# Patient Record
Sex: FEMALE | Race: WHITE | Employment: FULL TIME | URBAN - NONMETROPOLITAN AREA
[De-identification: names, ages, dates, MRNs, and addresses within clinical notes are randomized per-mention and may not be internally consistent; named-entity substitution may affect disease eponyms.]

---

## 2017-07-11 DIAGNOSIS — R56.9 PARTIAL SEIZURE (HCC): ICD-10-CM

## 2017-07-11 RX ORDER — LEVETIRACETAM 750 MG/1
750 TABLET ORAL 2 TIMES DAILY
Qty: 60 TABLET | Refills: 6 | Status: SHIPPED | OUTPATIENT
Start: 2017-07-11 | End: 2018-02-05 | Stop reason: SDUPTHER

## 2017-07-11 RX ORDER — PHENYTOIN SODIUM 300 MG/1
CAPSULE, EXTENDED RELEASE ORAL
Qty: 60 CAPSULE | Refills: 6 | Status: SHIPPED | OUTPATIENT
Start: 2017-07-11 | End: 2018-02-05 | Stop reason: SDUPTHER

## 2017-07-11 RX ORDER — PHENYTOIN SODIUM 100 MG/1
CAPSULE, EXTENDED RELEASE ORAL
Qty: 30 CAPSULE | Refills: 6 | Status: SHIPPED | OUTPATIENT
Start: 2017-07-11 | End: 2018-02-05 | Stop reason: SDUPTHER

## 2017-07-19 ENCOUNTER — OFFICE VISIT (OUTPATIENT)
Dept: PHYSICAL MEDICINE AND REHAB | Age: 57
End: 2017-07-19
Payer: COMMERCIAL

## 2017-07-19 VITALS
SYSTOLIC BLOOD PRESSURE: 181 MMHG | HEART RATE: 78 BPM | RESPIRATION RATE: 24 BRPM | BODY MASS INDEX: 45.99 KG/M2 | DIASTOLIC BLOOD PRESSURE: 92 MMHG | WEIGHT: 293 LBS | HEIGHT: 67 IN

## 2017-07-19 DIAGNOSIS — R56.9 PARTIAL SEIZURE (HCC): Primary | ICD-10-CM

## 2017-07-19 PROCEDURE — 99213 OFFICE O/P EST LOW 20 MIN: CPT | Performed by: PSYCHIATRY & NEUROLOGY

## 2017-10-16 LAB
ALBUMIN SERPL-MCNC: 4.4 G/DL
ALP BLD-CCNC: 178 U/L
ALT SERPL-CCNC: 29 U/L
ANION GAP SERPL CALCULATED.3IONS-SCNC: 10 MMOL/L
AST SERPL-CCNC: 25 U/L
BASOPHILS ABSOLUTE: 0.02 /ΜL
BASOPHILS RELATIVE PERCENT: 0 %
BILIRUB SERPL-MCNC: 0.22 MG/DL (ref 0.1–1.4)
BILIRUBIN URINE: NORMAL MG/DL
BLOOD, URINE: NORMAL
BUN BLDV-MCNC: 11 MG/DL
CALCIUM SERPL-MCNC: 9.4 MG/DL
CHLORIDE BLD-SCNC: 106 MMOL/L
CLARITY: NORMAL
CO2: 25 MMOL/L
COLOR: YELLOW
CREAT SERPL-MCNC: 0.9 MG/DL
EOSINOPHILS ABSOLUTE: 0.22 /ΜL
EOSINOPHILS RELATIVE PERCENT: 2 %
GFR CALCULATED: NORMAL
GLUCOSE BLD-MCNC: 113 MG/DL
GLUCOSE URINE: NEGATIVE
HCT VFR BLD CALC: 43.2 % (ref 36–46)
HEMOGLOBIN: 15 G/DL (ref 12–16)
KETONES, URINE: NEGATIVE
LEUKOCYTE ESTERASE, URINE: NEGATIVE
LYMPHOCYTES ABSOLUTE: 2.93 /ΜL
LYMPHOCYTES RELATIVE PERCENT: 30 %
MCH RBC QN AUTO: 31.6 PG
MCHC RBC AUTO-ENTMCNC: 34.7 G/DL
MCV RBC AUTO: 91.1 FL
MONOCYTES ABSOLUTE: 1.08 /ΜL
MONOCYTES RELATIVE PERCENT: 11 %
NEUTROPHILS ABSOLUTE: 5.62 /ΜL
NEUTROPHILS RELATIVE PERCENT: 57 %
NITRITE, URINE: NEGATIVE
PDW BLD-RTO: 12.5 %
PH UA: 5 (ref 4.5–8)
PLATELET # BLD: 250 K/ΜL
PMV BLD AUTO: NORMAL FL
POTASSIUM SERPL-SCNC: 3.6 MMOL/L
PROTEIN UA: NORMAL
RBC # BLD: 4.74 10^6/ΜL
SODIUM BLD-SCNC: 141 MMOL/L
SPECIFIC GRAVITY UA: 1.02 (ref 1–1.03)
TOTAL PROTEIN: 7.9
UROBILINOGEN, URINE: NORMAL
WBC # BLD: 9.9 10^3/ML

## 2017-10-18 ENCOUNTER — HOSPITAL ENCOUNTER (OUTPATIENT)
Age: 57
Discharge: HOME | End: 2017-10-18
Payer: COMMERCIAL

## 2017-10-18 ENCOUNTER — OFFICE VISIT (OUTPATIENT)
Dept: UROLOGY | Age: 57
End: 2017-10-18
Payer: COMMERCIAL

## 2017-10-18 ENCOUNTER — TELEPHONE (OUTPATIENT)
Dept: UROLOGY | Age: 57
End: 2017-10-18

## 2017-10-18 VITALS
SYSTOLIC BLOOD PRESSURE: 166 MMHG | WEIGHT: 293 LBS | BODY MASS INDEX: 45.99 KG/M2 | DIASTOLIC BLOOD PRESSURE: 84 MMHG | HEIGHT: 67 IN

## 2017-10-18 DIAGNOSIS — R35.0 URINARY FREQUENCY: ICD-10-CM

## 2017-10-18 DIAGNOSIS — N21.0 BLADDER CALCULUS: Primary | ICD-10-CM

## 2017-10-18 LAB
BILIRUBIN URINE: NEGATIVE
BLOOD URINE, POC: ABNORMAL
CHARACTER, URINE: CLEAR
COLOR, URINE: YELLOW
EKG ATRIAL RATE: 76 BPM
EKG P AXIS: 25 DEGREES
EKG P-R INTERVAL: 162 MS
EKG Q-T INTERVAL: 426 MS
EKG QRS DURATION: 82 MS
EKG QTC CALCULATION (BAZETT): 479 MS
EKG R AXIS: 35 DEGREES
EKG T AXIS: -23 DEGREES
EKG VENTRICULAR RATE: 76 BPM
GLUCOSE URINE: NEGATIVE MG/DL
KETONES, URINE: NEGATIVE
LEUKOCYTE CLUMPS, URINE: NEGATIVE
NITRITE, URINE: NEGATIVE
PH, URINE: 5.5
PROTEIN, URINE: 30 MG/DL
SPECIFIC GRAVITY, URINE: >= 1.03 (ref 1–1.03)
UROBILINOGEN, URINE: 0.2 EU/DL

## 2017-10-18 PROCEDURE — 93005 ELECTROCARDIOGRAM TRACING: CPT

## 2017-10-18 PROCEDURE — 99203 OFFICE O/P NEW LOW 30 MIN: CPT | Performed by: NURSE PRACTITIONER

## 2017-10-18 PROCEDURE — 81003 URINALYSIS AUTO W/O SCOPE: CPT | Performed by: NURSE PRACTITIONER

## 2017-10-18 PROCEDURE — 99999 EKG 12-LEAD: CPT | Performed by: NURSE PRACTITIONER

## 2017-10-18 RX ORDER — TAMSULOSIN HYDROCHLORIDE 0.4 MG/1
0.4 CAPSULE ORAL DAILY
COMMUNITY
End: 2017-11-17 | Stop reason: ALTCHOICE

## 2017-10-18 NOTE — PATIENT INSTRUCTIONS
resection of a large segment of bowel due to inflammatory bowel disease---you may have excessive oxalate absorption from the gut increasing your stone risk. Depending on the stone composition and or 24 hour urine tests, specific recommendations can be made to help prevent kidney stone formation in the future. · Obesity--Kidney stones are more common with obesity. Any weight reduction can be helpful. Do your best!!    Further testing:  · A 24 hour urine collection test called a [place]anne Roads may be ordered by your physician at your follow-up appointment in the office. This test analyzes the elements present in your urine that may increase your risk for kidney stone formation. It allows your medical provider to pinpoint what specific dietary changes or medicine changes can be made to prevent future stones. · Stone analysis can be done on stone fragments retrieved during your procedure or from fragments that you pass in your urine to aid in prevention of future stones.

## 2017-10-18 NOTE — PROGRESS NOTES
after 30.00 years of use. She has never used smokeless tobacco. She reports that she does not drink alcohol or use drugs. Subjective:      Review of Systems   Constitutional: Negative for activity change, appetite change, chills, diaphoresis, fatigue, fever and unexpected weight change. HENT: Negative. Eyes: Negative. Respiratory: Negative. Negative for cough, choking, chest tightness and shortness of breath. Cardiovascular: Negative. Gastrointestinal: Negative. Endocrine: Negative. Genitourinary: Positive for frequency and urgency. Negative for decreased urine volume, difficulty urinating, dysuria, flank pain and hematuria. Musculoskeletal: Negative. Skin: Negative. Allergic/Immunologic: Negative. Neurological: Negative. Hematological: Negative. Psychiatric/Behavioral: Negative. Objective:   BP (!) 166/84   Ht 5' 7\" (1.702 m)   Wt (!) 364 lb (165.1 kg)   BMI 57.01 kg/m²     Physical Exam   Constitutional: She is oriented to person, place, and time. She appears well-developed and well-nourished. No distress. HENT:   Head: Normocephalic and atraumatic. Right Ear: External ear normal.   Left Ear: External ear normal.   Eyes: Conjunctivae and EOM are normal. Pupils are equal, round, and reactive to light. Right eye exhibits no discharge. Left eye exhibits no discharge. Neck: Normal range of motion. Neck supple. No JVD present. Cardiovascular: Normal rate and regular rhythm. Pulmonary/Chest: Effort normal and breath sounds normal. No stridor. Abdominal: Soft. Bowel sounds are normal. She exhibits no distension. There is no tenderness. No CVA tenderness   Musculoskeletal: Normal range of motion. Neurological: She is alert and oriented to person, place, and time. Skin: Skin is warm and dry. She is not diaphoretic. Psychiatric: She has a normal mood and affect.  Her behavior is normal. Judgment and thought content normal.   Vitals reviewed. POC  Results for POC orders placed in visit on 10/18/17   POCT Urinalysis No Micro (Auto)   Result Value Ref Range    Glucose, Ur Negative NEGATIVE mg/dl    Bilirubin Urine Negative     Ketones, Urine Negative NEGATIVE    Specific Gravity, Urine >= 1.030 1.002 - 1.03    Blood, UA POC Trace-intact NEGATIVE    pH, Urine 5.50 5.0 - 9.0    Protein, Urine 30 (A) NEGATIVE mg/dl    Urobilinogen, Urine 0.20 0.0 - 1.0 eu/dl    Nitrite, Urine Negative NEGATIVE    Leukocyte Clumps, Urine Negative NEGATIVE    Color, Urine Yellow YELLOW-STR    Character, Urine Clear [id number]       Radiology  The patient has had a CT Stone Protocol which I have reviewed along with its accompanying report. The study demonstrates a 6 mm bladder calculus, no renal or ureteral calculi, no hydronephrosis, 2.1 cm left adrenal nodule. Assessment:     1. Bladder calculus        Plan:     Pt reporting significant frequency and urgency she reports is making her \"miserable\". Pt with 6 mm bladder calculus noted on CT imaging. Discussed trial of spontaneous passage vs cystolitholapaxy. Pt wishes to proceed with surgical removal.  I described the procedure in detail and also described the associated risks and benefits at length. We discussed possible alternative therapies. We discussed the risks and benefits of not undergoing therapy. Patient understands these risks and benefits and desires to proceed. Pt will be scheduled for cystolitholapaxy with Dr. Chestine Duverney. She will need Litholink in follow-up. Order provided.

## 2017-10-18 NOTE — TELEPHONE ENCOUNTER
ekg faxed to dr morillo's office asking for clearance prior to bruce's 10-20-17 surgery with dr Kris Potts, at [facility]

## 2017-10-19 ASSESSMENT — ENCOUNTER SYMPTOMS
SHORTNESS OF BREATH: 0
CHEST TIGHTNESS: 0
CHOKING: 0
RESPIRATORY NEGATIVE: 1
COUGH: 0
EYES NEGATIVE: 1
GASTROINTESTINAL NEGATIVE: 1
ALLERGIC/IMMUNOLOGIC NEGATIVE: 1

## 2017-10-27 ENCOUNTER — TELEPHONE (OUTPATIENT)
Dept: CARDIOLOGY CLINIC | Age: 57
End: 2017-10-27

## 2017-10-30 ENCOUNTER — OFFICE VISIT (OUTPATIENT)
Dept: CARDIOLOGY CLINIC | Age: 57
End: 2017-10-30
Payer: COMMERCIAL

## 2017-10-30 VITALS
HEART RATE: 60 BPM | HEIGHT: 67 IN | SYSTOLIC BLOOD PRESSURE: 146 MMHG | BODY MASS INDEX: 45.99 KG/M2 | WEIGHT: 293 LBS | DIASTOLIC BLOOD PRESSURE: 62 MMHG

## 2017-10-30 DIAGNOSIS — R07.82 INTERCOSTAL PAIN: ICD-10-CM

## 2017-10-30 DIAGNOSIS — Z01.810 PREOP CARDIOVASCULAR EXAM: ICD-10-CM

## 2017-10-30 DIAGNOSIS — R94.31 ABNORMAL EKG: ICD-10-CM

## 2017-10-30 DIAGNOSIS — R94.31 T WAVE INVERSION IN EKG: ICD-10-CM

## 2017-10-30 DIAGNOSIS — R07.9 CHEST PAIN, UNSPECIFIED TYPE: Primary | ICD-10-CM

## 2017-10-30 PROCEDURE — 99203 OFFICE O/P NEW LOW 30 MIN: CPT | Performed by: INTERNAL MEDICINE

## 2017-10-30 NOTE — PROGRESS NOTES
New pt here for abn ekg   Pt had ekg for pre op for kidney stone with Dr. Jovita Gaucher   Denies chest pain, dizziness  Gets some SOB on exertion

## 2017-10-30 NOTE — PROGRESS NOTES
Lilian Gong is a 65 y.o. female is here for   Preoperative work for cystolitholapaxy  and has had none and just possible aches   chest pains . Intensity of the pain none , provocation of the pain none , what relieves the pain   None  where does  the pain migrates to none  andno  nausea no fever and chills and no sob with and without activity. No evidence of swelling in legs no palpitations and no syncopal episodes and no dizziness ,no bleeding ,or urination  Problems ,no double visions ,no speech problems and no bowel problems or diarrhea and tolerating medications     Family of cad dad  at 59 years and cabg 46     Patient Active Problem List   Diagnosis    Partial seizure ([facility] [address]     Past Medical History:   Diagnosis Date    Chronic kidney disease     Psoriasis     Seizures ([facility] [address]      Social History   Substance Use Topics    Smoking status: Former Smoker     Years: 30.00    Smokeless tobacco: Never Used    Alcohol use No     No Known Allergies  Current Outpatient Prescriptions   Medication Sig Dispense Refill    levETIRAcetam (KEPPRA) 750 MG tablet Take 1 tablet by mouth 2 times daily 60 tablet 6    PHENYTEK 300 MG ER capsule TAKE ONE CAPSULE BY MOUTH TWICE DAILY 60 capsule 6    phenytoin (DILANTIN) 100 MG ER capsule TAKE ONE CAPSULE BY MOUTH ONCE DAILY 30 capsule 6    Multiple Vitamin (MULTIVITAMIN PO) Take  by mouth daily.  tamsulosin (FLOMAX) 0.4 MG capsule Take 0.4 mg by mouth daily       No current facility-administered medications for this visit.         REVIEW OF SYSTEM  Constitutional  symptoms such as fever chills and malaise and weakness  Are negative   HE ENT negative  HEART all negative  LUNGS all negative   ABDOMEN all negative  GENITAL URINARY all within normal limits  NEUROLOGY all negative  NECK all negative   SKIN all negative  MUSCULOSKELETAL negative  HEMATOLOGICAL negative  PSYCHIATRIC  negative    Vitals:    10/30/17 1436   BP: (!) 146/62   Pulse: 60   Weight: (!) 361 lb and scoliosis and normal range of motion for all extremities  INTEGUMENTARY without tenting and skin rashes indentation and  dryness  NECK without lymph adenopathy   NEUROPSYCHIATRIC has no anxiety, no mood swings and depression  VASCULAR EXAM for carotid ,radial femoral arteries are  steady  and not diminished   Extremities no evidence of peripheral edema  And pulses are  normal    Assessment and plans  1. Chest pain, unspecified type  Stress test, lexiscan    ECHO Complete 2D W Doppler W Color   2. Abnormal EKG  Stress test, lexiscan    ECHO Complete 2D W Doppler W Color   3. T wave inversion in EKG  Stress test, lexiscan    ECHO Complete 2D W Doppler W Color   4. Preop cardiovascular exam     5. Intercostal pain       ekg sinus with T wave inversion in inferior wall     Nuclear stress test is recommended  to patient to exclude ischemia abnormal ekg and patient  agrees with that work up and its risks and benefits and potential need for additional testing if stress test is abnormal such as cardiac catheterization. In addition patient is advised to avoid strenuous activity that not only  Includes all strenuous activities  and is not limited to sexual activities and driving heavy equipments and traveling long distances and hunting or even stressfully events  In the mean time while we a wait stress test testing. Echocardiogram was ordered to exclude structural heart disease abnormal ekg  and patient agrees too. Advised patient about risk factor modifications including exercise 30 mins a day and blood pressure control and other pertinent cardiac risk factors and patient accepts to modify them.     Patient was advised to return in 3months for follow up or sooner if there is any change in care     Risk assesement is done in our office and since patient is symptomatic at times  from cardiac point of view and current work up including testing if favorable as such patient would not have contraindication for surgery from cardiology stand point.           Electronically signed by Chelita Kingsley DO on 10/30/2017 at 3:07 PM

## 2017-11-06 ENCOUNTER — HOSPITAL ENCOUNTER (OUTPATIENT)
Dept: NON INVASIVE DIAGNOSTICS | Age: 57
Discharge: HOME | End: 2017-11-06
Payer: COMMERCIAL

## 2017-11-06 DIAGNOSIS — R07.9 CHEST PAIN, UNSPECIFIED TYPE: ICD-10-CM

## 2017-11-06 DIAGNOSIS — R94.31 ABNORMAL EKG: ICD-10-CM

## 2017-11-06 DIAGNOSIS — R94.31 T WAVE INVERSION IN EKG: ICD-10-CM

## 2017-11-06 LAB
LV EF: 55 %
LVEF MODALITY: NORMAL

## 2017-11-06 PROCEDURE — 93306 TTE W/DOPPLER COMPLETE: CPT

## 2017-11-06 PROCEDURE — 78452 HT MUSCLE IMAGE SPECT MULT: CPT

## 2017-11-06 PROCEDURE — 6360000002 HC RX W HCPCS

## 2017-11-06 PROCEDURE — A9500 TC99M SESTAMIBI: HCPCS | Performed by: INTERNAL MEDICINE

## 2017-11-06 PROCEDURE — 93017 CV STRESS TEST TRACING ONLY: CPT

## 2017-11-06 PROCEDURE — 3430000000 HC RX DIAGNOSTIC RADIOPHARMACEUTICAL: Performed by: INTERNAL MEDICINE

## 2017-11-06 RX ADMIN — Medication 10.1 MILLICURIE: at 13:42

## 2017-11-06 RX ADMIN — Medication 35 MILLICURIE: at 14:38

## 2017-11-07 DIAGNOSIS — R94.39 ABNORMAL STRESS TEST: Primary | ICD-10-CM

## 2017-11-07 RX ORDER — SIMVASTATIN 20 MG
20 TABLET ORAL NIGHTLY
Qty: 30 TABLET | Refills: 3 | Status: SHIPPED | OUTPATIENT
Start: 2017-11-07 | End: 2018-02-13 | Stop reason: ALTCHOICE

## 2017-11-07 RX ORDER — ASPIRIN 325 MG
325 TABLET ORAL DAILY
COMMUNITY
End: 2018-09-20 | Stop reason: ALTCHOICE

## 2017-11-24 ENCOUNTER — PREP FOR PROCEDURE (OUTPATIENT)
Dept: CARDIOLOGY | Age: 57
End: 2017-11-24

## 2017-11-24 RX ORDER — SODIUM CHLORIDE 0.9 % (FLUSH) 0.9 %
10 SYRINGE (ML) INJECTION EVERY 12 HOURS SCHEDULED
Status: CANCELLED | OUTPATIENT
Start: 2017-11-24

## 2017-11-24 RX ORDER — ASPIRIN 81 MG/1
324 TABLET, CHEWABLE ORAL ONCE
Status: CANCELLED | OUTPATIENT
Start: 2017-11-24 | End: 2017-11-24

## 2017-11-24 RX ORDER — SODIUM CHLORIDE 9 MG/ML
INJECTION, SOLUTION INTRAVENOUS CONTINUOUS
Status: CANCELLED | OUTPATIENT
Start: 2017-11-24

## 2017-11-24 RX ORDER — DIPHENHYDRAMINE HYDROCHLORIDE 50 MG/ML
25 INJECTION INTRAMUSCULAR; INTRAVENOUS ONCE
Status: CANCELLED | OUTPATIENT
Start: 2017-11-24 | End: 2017-11-24

## 2017-11-24 RX ORDER — SODIUM CHLORIDE 0.9 % (FLUSH) 0.9 %
10 SYRINGE (ML) INJECTION PRN
Status: CANCELLED | OUTPATIENT
Start: 2017-11-24

## 2017-11-24 RX ORDER — NITROGLYCERIN 0.4 MG/1
0.4 TABLET SUBLINGUAL EVERY 5 MIN PRN
Status: CANCELLED | OUTPATIENT
Start: 2017-11-24

## 2017-11-26 RX ORDER — SODIUM CHLORIDE 9 MG/ML
INJECTION, SOLUTION INTRAVENOUS CONTINUOUS
Status: CANCELLED | OUTPATIENT
Start: 2017-11-26

## 2017-11-27 ENCOUNTER — HOSPITAL ENCOUNTER (OUTPATIENT)
Dept: INPATIENT UNIT | Age: 57
Discharge: HOME | End: 2017-11-27
Attending: INTERNAL MEDICINE | Admitting: INTERNAL MEDICINE
Payer: COMMERCIAL

## 2017-11-27 VITALS
DIASTOLIC BLOOD PRESSURE: 66 MMHG | WEIGHT: 293 LBS | SYSTOLIC BLOOD PRESSURE: 138 MMHG | OXYGEN SATURATION: 98 % | HEIGHT: 67 IN | TEMPERATURE: 98 F | HEART RATE: 61 BPM | RESPIRATION RATE: 16 BRPM | BODY MASS INDEX: 45.99 KG/M2

## 2017-11-27 LAB
ABO: NORMAL
ANION GAP SERPL CALCULATED.3IONS-SCNC: 12 MEQ/L (ref 8–16)
ANTIBODY SCREEN: NORMAL
BUN BLDV-MCNC: 15 MG/DL (ref 7–22)
CALCIUM SERPL-MCNC: 8.8 MG/DL (ref 8.5–10.5)
CHLORIDE BLD-SCNC: 100 MEQ/L (ref 98–111)
CHOLESTEROL, TOTAL: 177 MG/DL (ref 100–199)
CO2: 27 MEQ/L (ref 23–33)
CREAT SERPL-MCNC: 0.8 MG/DL (ref 0.4–1.2)
EKG ATRIAL RATE: 61 BPM
EKG P AXIS: -17 DEGREES
EKG P-R INTERVAL: 192 MS
EKG Q-T INTERVAL: 428 MS
EKG QRS DURATION: 94 MS
EKG QTC CALCULATION (BAZETT): 430 MS
EKG R AXIS: -17 DEGREES
EKG T AXIS: -9 DEGREES
EKG VENTRICULAR RATE: 61 BPM
GFR SERPL CREATININE-BSD FRML MDRD: 74 ML/MIN/1.73M2
GLUCOSE BLD-MCNC: 94 MG/DL (ref 70–108)
HCT VFR BLD CALC: 41.4 % (ref 37–47)
HDLC SERPL-MCNC: 57 MG/DL
HEMOGLOBIN: 13.9 GM/DL (ref 12–16)
LDL CHOLESTEROL CALCULATED: 93 MG/DL
MCH RBC QN AUTO: 31.1 PG (ref 27–31)
MCHC RBC AUTO-ENTMCNC: 33.6 GM/DL (ref 33–37)
MCV RBC AUTO: 92.6 FL (ref 81–99)
PDW BLD-RTO: 14 % (ref 11.5–14.5)
PLATELET # BLD: 283 THOU/MM3 (ref 130–400)
PMV BLD AUTO: 8.4 MCM (ref 7.4–10.4)
POTASSIUM SERPL-SCNC: 4.4 MEQ/L (ref 3.5–5.2)
RBC # BLD: 4.47 MILL/MM3 (ref 4.2–5.4)
RH FACTOR: NORMAL
SODIUM BLD-SCNC: 139 MEQ/L (ref 135–145)
TRIGL SERPL-MCNC: 135 MG/DL (ref 0–199)
WBC # BLD: 8.1 THOU/MM3 (ref 4.8–10.8)

## 2017-11-27 PROCEDURE — 80048 BASIC METABOLIC PNL TOTAL CA: CPT

## 2017-11-27 PROCEDURE — 2580000003 HC RX 258: Performed by: INTERNAL MEDICINE

## 2017-11-27 PROCEDURE — 86850 RBC ANTIBODY SCREEN: CPT

## 2017-11-27 PROCEDURE — 2780000010 HC IMPLANT OTHER

## 2017-11-27 PROCEDURE — C1725 CATH, TRANSLUMIN NON-LASER: HCPCS

## 2017-11-27 PROCEDURE — 6360000002 HC RX W HCPCS

## 2017-11-27 PROCEDURE — C1769 GUIDE WIRE: HCPCS

## 2017-11-27 PROCEDURE — 86901 BLOOD TYPING SEROLOGIC RH(D): CPT

## 2017-11-27 PROCEDURE — 85027 COMPLETE CBC AUTOMATED: CPT

## 2017-11-27 PROCEDURE — C1894 INTRO/SHEATH, NON-LASER: HCPCS

## 2017-11-27 PROCEDURE — 36415 COLL VENOUS BLD VENIPUNCTURE: CPT

## 2017-11-27 PROCEDURE — 80061 LIPID PANEL: CPT

## 2017-11-27 PROCEDURE — 93005 ELECTROCARDIOGRAM TRACING: CPT

## 2017-11-27 PROCEDURE — 93458 L HRT ARTERY/VENTRICLE ANGIO: CPT | Performed by: INTERNAL MEDICINE

## 2017-11-27 PROCEDURE — 2500000003 HC RX 250 WO HCPCS

## 2017-11-27 PROCEDURE — 86900 BLOOD TYPING SEROLOGIC ABO: CPT

## 2017-11-27 RX ORDER — ONDANSETRON 2 MG/ML
4 INJECTION INTRAMUSCULAR; INTRAVENOUS EVERY 6 HOURS PRN
Status: DISCONTINUED | OUTPATIENT
Start: 2017-11-27 | End: 2017-11-27 | Stop reason: HOSPADM

## 2017-11-27 RX ORDER — DIAZEPAM 5 MG/1
5 TABLET ORAL
Status: DISCONTINUED | OUTPATIENT
Start: 2017-11-27 | End: 2017-11-27 | Stop reason: HOSPADM

## 2017-11-27 RX ORDER — SODIUM CHLORIDE 0.9 % (FLUSH) 0.9 %
10 SYRINGE (ML) INJECTION PRN
Status: DISCONTINUED | OUTPATIENT
Start: 2017-11-27 | End: 2017-11-27 | Stop reason: SDUPTHER

## 2017-11-27 RX ORDER — ACETAMINOPHEN 325 MG/1
650 TABLET ORAL EVERY 4 HOURS PRN
Status: DISCONTINUED | OUTPATIENT
Start: 2017-11-27 | End: 2017-11-27 | Stop reason: HOSPADM

## 2017-11-27 RX ORDER — ATROPINE SULFATE 0.4 MG/ML
0.5 AMPUL (ML) INJECTION
Status: DISCONTINUED | OUTPATIENT
Start: 2017-11-27 | End: 2017-11-27 | Stop reason: HOSPADM

## 2017-11-27 RX ORDER — SODIUM CHLORIDE 9 MG/ML
INJECTION, SOLUTION INTRAVENOUS CONTINUOUS
Status: DISCONTINUED | OUTPATIENT
Start: 2017-11-27 | End: 2017-11-27 | Stop reason: RX

## 2017-11-27 RX ORDER — DIPHENHYDRAMINE HCL 25 MG
25 TABLET ORAL
Status: DISCONTINUED | OUTPATIENT
Start: 2017-11-27 | End: 2017-11-27 | Stop reason: HOSPADM

## 2017-11-27 RX ORDER — SODIUM CHLORIDE 0.9 % (FLUSH) 0.9 %
10 SYRINGE (ML) INJECTION PRN
Status: DISCONTINUED | OUTPATIENT
Start: 2017-11-27 | End: 2017-11-27 | Stop reason: HOSPADM

## 2017-11-27 RX ORDER — SODIUM CHLORIDE 0.9 % (FLUSH) 0.9 %
10 SYRINGE (ML) INJECTION EVERY 12 HOURS SCHEDULED
Status: CANCELLED | OUTPATIENT
Start: 2017-11-27

## 2017-11-27 RX ORDER — MORPHINE SULFATE 2 MG/ML
2 INJECTION, SOLUTION INTRAMUSCULAR; INTRAVENOUS
Status: DISCONTINUED | OUTPATIENT
Start: 2017-11-27 | End: 2017-11-27 | Stop reason: HOSPADM

## 2017-11-27 RX ORDER — SODIUM CHLORIDE 0.9 % (FLUSH) 0.9 %
10 SYRINGE (ML) INJECTION EVERY 12 HOURS SCHEDULED
Status: DISCONTINUED | OUTPATIENT
Start: 2017-11-27 | End: 2017-11-27 | Stop reason: HOSPADM

## 2017-11-27 RX ADMIN — Medication 10 ML: at 06:59

## 2017-11-27 NOTE — PROGRESS NOTES
INT discont'd  Telemetry sicont'd  Discharge instructions reviewed with pt - pt voices understanding of f/u appointment, how to care for procedure site and activity restrictions

## 2017-11-27 NOTE — H&P
PRE-PROCEDURE   Code Status: FULL CODE  Brief History/Pre-Procedure Diagnosis: angina    Consent: :I have discussed with the patient and/or the patient representative the indication, alternatives, and the possible risks and/or complications of the planned procedure and the anesthesia methods. The patient and/or representative appear to understand and agree to proceed. The discussion encompasses risks, benefits, and side effects related to the alternatives and the risks related to not receiving the proposed care, treatment, and services. MEDICAL HISTORY  [x]ASHD/ANGINA/MI/CHF   []Hypertension  []Diabetes  []Hyperlipidemia  []Smoking  []Family Hx of ASHD  []Additional information:      Past Medical History:   Diagnosis Date    Chronic kidney disease     Psoriasis     Seizures ([facility] [address]        SURGICAL HISTORY  Past Surgical History:   Procedure Laterality Date    DILATION AND CURETTAGE OF UTERUS      KIDNEY STONE SURGERY      LITHOTRIPSY      TONSILLECTOMY AND ADENOIDECTOMY         ALLERGIES  Allergies   Allergen Reactions    Gabapentin Other (See Comments)     DOESN'T LIKE THE WAY IT MAKES HER FEEL.        Additional information:       MEDICATIONS   Aspirin  [x] 81 mg  [] 325 mg  [] None  Antiplatelet drug therapy use last 5 days  []No []Yes  Coumadin Use Last 5 Days []No []Yes  Other anticoagulant use last 5 days  []No []Yes  Current Facility-Administered Medications   Medication Dose Route Frequency Provider Last Rate Last Dose    sodium chloride flush 0.9 % injection 10 mL  10 mL Intravenous 2 times per day Charlean Overall, DO        sodium chloride flush 0.9 % injection 10 mL  10 mL Intravenous PRN Charlean Overall, DO   10 mL at 11/27/17 0659    diazepam (VALIUM) tablet 5 mg  5 mg Oral Once PRN Charlean Overall, DO        diphenhydrAMINE (BENADRYL) tablet 25 mg  25 mg Oral Once PRN Charlean Overall, DO         Prescriptions Prior to Admission: aspirin 325 MG tablet, Take 325 mg by mouth daily  metoprolol tartrate (LOPRESSOR) 25 MG tablet, Take 1 tablet by mouth 2 times daily  simvastatin (ZOCOR) 20 MG tablet, Take 1 tablet by mouth nightly  levETIRAcetam (KEPPRA) 750 MG tablet, Take 1 tablet by mouth 2 times daily  PHENYTEK 300 MG ER capsule, TAKE ONE CAPSULE BY MOUTH TWICE DAILY  phenytoin (DILANTIN) 100 MG ER capsule, TAKE ONE CAPSULE BY MOUTH ONCE DAILY  Multiple Vitamin (MULTIVITAMIN PO), Take  by mouth daily. Additional information:       VITAL SIGNS   Vitals:    11/17/17 1153 11/27/17 0700 11/27/17 0708   BP:  (!) 157/87    Pulse:  68    Resp:  18    Temp:  98.6 °F (37 °C)    TempSrc:  Oral    SpO2:  98%    Weight: (!) 361 lb (163.7 kg)  (!) 361 lb (163.7 kg)   Height: 5' 7\" (1.702 m)  5' 7\" (1.702 m)       PHYSICAL:   General: No acute distress  HEENT:  Unremarkable for age  Neck: without increased JVD, carotid pulses 2+ bilaterally without bruits  Heart: RRR, S1 & S2 WNL, S4 gallop, without murmurs or rubs    Lungs: Clear to auscultation    Abdomen: BS present, without HSM, masses, or tenderness    Extremities: without C,C,E.  Pulses 2+ bilaterally  Mental Status: Alert & Oriented        PLANNED PROCEDURE   [x]Cath  []PCI                []Pacemaker/AICD  []ROMULO             []Cardioversion []Peripheral angiography/PTA  []Other:      SEDATION  Planned agent:[x]Midazolam []Meperidine [x]Sublimaze []Morphine  []Diazepam  []Other:     ASA Classification:  []1 [x]2 []3 []4 []5  Class 1: A normal healthy patient  Class 2: Pt with mild to moderate systemic disease  Class 3: Severe systemic disease or disturbance  Class 4: Severe systemic disorders that are already life threatening. Class 5: Moribund pt with little chances of survival, for more than 24 hours. Mallampati I Airway Classification:   []1 [x]2 []3 []4    [x]Pre-procedure diagnostic studies complete and results available. Comment:    [x]Previous sedation/anesthesia experiences assessed.    Comment:    [x]The patient is an appropriate candidate to undergo the planned procedure sedation and anesthesia. (Refer to nursing sedation/analgesia documentation record)  [x]Formulation and discussion of sedation/procedure plan, risks, and expectations with patient and/or responsible adult completed. [x]Patient examined immediately prior to the procedure. (Refer to nursing sedation/analgesia documentation record)    Stacy Espinoza D.O., JANIS Ayala., F.A.C.O.I., FA,C,C                                                                                                                 BOARD CERTIFIED  CARDIOLOGIST

## 2017-11-27 NOTE — PROCEDURES
days.         Charu Beard D.O.    D: 11/27/2017 13:37:27       T: 11/27/2017 13:38:57     [email or web address]  Job#: [id number]     Doc#: [id number]    CC:

## 2017-11-27 NOTE — PROGRESS NOTES
Dr Marnie Vargas in to review procedure findings with pt and family  Pt taking diet/fluids - izzy well

## 2017-11-27 NOTE — PROGRESS NOTES
Pt admitted to  05 ambulatory for cardiac cath  Pt NPO. Patient accompanied by family  Vital signs obtained. Assessment and data collection initiated. Oriented to room. Policies and procedures for 2E explained   All questions answered with no further questions at this time.

## 2018-02-05 ENCOUNTER — OFFICE VISIT (OUTPATIENT)
Dept: NEUROLOGY | Age: 58
End: 2018-02-05
Payer: COMMERCIAL

## 2018-02-05 VITALS
BODY MASS INDEX: 45.99 KG/M2 | HEART RATE: 68 BPM | DIASTOLIC BLOOD PRESSURE: 76 MMHG | HEIGHT: 67 IN | SYSTOLIC BLOOD PRESSURE: 120 MMHG | WEIGHT: 293 LBS

## 2018-02-05 DIAGNOSIS — R56.9 SEIZURES (HCC): Primary | ICD-10-CM

## 2018-02-05 DIAGNOSIS — R56.9 PARTIAL SEIZURE (HCC): Primary | ICD-10-CM

## 2018-02-05 PROCEDURE — 99213 OFFICE O/P EST LOW 20 MIN: CPT | Performed by: PSYCHIATRY & NEUROLOGY

## 2018-02-05 RX ORDER — PHENYTOIN SODIUM 100 MG/1
CAPSULE, EXTENDED RELEASE ORAL
Qty: 30 CAPSULE | Refills: 6 | Status: SHIPPED | OUTPATIENT
Start: 2018-02-05 | End: 2019-06-24 | Stop reason: ALTCHOICE

## 2018-02-05 RX ORDER — PHENYTOIN SODIUM 300 MG/1
CAPSULE, EXTENDED RELEASE ORAL
Qty: 60 CAPSULE | Refills: 6 | Status: SHIPPED | OUTPATIENT
Start: 2018-02-05 | End: 2018-08-07

## 2018-02-05 RX ORDER — LEVETIRACETAM 750 MG/1
750 TABLET ORAL 2 TIMES DAILY
Qty: 60 TABLET | Refills: 6 | Status: SHIPPED | OUTPATIENT
Start: 2018-02-05 | End: 2018-09-05 | Stop reason: SDUPTHER

## 2018-02-06 ENCOUNTER — TELEPHONE (OUTPATIENT)
Dept: NEUROLOGY | Age: 58
End: 2018-02-06

## 2018-02-13 ENCOUNTER — OFFICE VISIT (OUTPATIENT)
Dept: CARDIOLOGY CLINIC | Age: 58
End: 2018-02-13
Payer: COMMERCIAL

## 2018-02-13 VITALS
HEIGHT: 67 IN | BODY MASS INDEX: 45.99 KG/M2 | HEART RATE: 84 BPM | DIASTOLIC BLOOD PRESSURE: 68 MMHG | SYSTOLIC BLOOD PRESSURE: 136 MMHG | WEIGHT: 293 LBS

## 2018-02-13 DIAGNOSIS — E78.1 PURE HYPERGLYCERIDEMIA: Primary | ICD-10-CM

## 2018-02-13 DIAGNOSIS — I25.10 CORONARY ARTERY DISEASE INVOLVING NATIVE CORONARY ARTERY OF NATIVE HEART WITHOUT ANGINA PECTORIS: ICD-10-CM

## 2018-02-13 PROCEDURE — 99213 OFFICE O/P EST LOW 20 MIN: CPT | Performed by: INTERNAL MEDICINE

## 2018-02-13 RX ORDER — ATORVASTATIN CALCIUM 20 MG/1
20 TABLET, FILM COATED ORAL DAILY
Qty: 30 TABLET | Refills: 3 | Status: SHIPPED | OUTPATIENT
Start: 2018-02-13 | End: 2018-08-20

## 2018-02-13 NOTE — PROGRESS NOTES
Braydon Olivares is a 65 y.o. female is here for  mild to moderate CAD doing well in the RCA blood pressure is mildly increased but it is normally normal and has had no chest pains . Intensity of the pain none  provocation of the pain none what relieves the pain none , where does  the pain migrates to none  and no nausea no fever and chills and no sob with and without activity. No evidence of swelling in legs no palpitations and no syncopal episodes and no dizziness ,no bleeding ,or urination  Problems ,no double visions ,no speech problems and no bowel problems or diarrhea and tolerating medications     Patient Active Problem List   Diagnosis    Partial seizure ([facility] [address]    Preop cardiovascular exam    Intercostal pain    Angina, class II ([facility] [address]     Past Medical History:   Diagnosis Date    Chronic kidney disease     Psoriasis     Seizures ([facility] [address]      Social History   Substance Use Topics    Smoking status: Former Smoker     Years: 30.00     Types: Cigarettes     Quit date: 2013    Smokeless tobacco: Never Used    Alcohol use No     Allergies   Allergen Reactions    Zocor [Simvastatin] Other (See Comments)     Muscle achiness      Gabapentin Other (See Comments)     DOESN'T LIKE THE WAY IT MAKES HER FEEL. Current Outpatient Prescriptions   Medication Sig Dispense Refill    levETIRAcetam (KEPPRA) 750 MG tablet Take 1 tablet by mouth 2 times daily 60 tablet 6    PHENYTEK 300 MG ER capsule TAKE ONE CAPSULE BY MOUTH TWICE DAILY 60 capsule 6    phenytoin (DILANTIN) 100 MG ER capsule TAKE ONE CAPSULE BY MOUTH ONCE DAILY 30 capsule 6    aspirin 325 MG tablet Take 325 mg by mouth daily      metoprolol tartrate (LOPRESSOR) 25 MG tablet Take 1 tablet by mouth 2 times daily 60 tablet 3    Multiple Vitamin (MULTIVITAMIN PO) Take  by mouth daily. No current facility-administered medications for this visit.         REVIEW OF SYSTEM  Constitutional  symptoms such as fever chills and malaise and weakness  Are rales  ABDOMEN abdominal bruit not present  And  No  Presency of  morbid obesity and with no    non distended without organomegaly and no rebound tenderness and bowel sound are present  all quadrants   NEUROLOGY all the cranial nerves are intact and no motor deficits  and no sensory deficits  MUSCULAR SKELETAL without signs of trauma and kyphosis and scoliosis and normal range of motion for all extremities  INTEGUMENTARY without tenting and skin rashes indentation and  dryness  NECK without lymph adenopathy   NEUROPSYCHIATRIC has no anxiety, no mood swings and depression  VASCULAR EXAM for carotid ,radial femoral arteries are  steady  and not diminished   Extremities no evidence of peripheral edema  And pulses are  normal    Assessment and plans    1. Pure hyperglyceridemia     2. Coronary artery disease involving native coronary artery of native heart without angina pectoris moderate cad      Follow dr Yael Watson in 1 year     Started  lipitor 20 mg day he had muscle spasms or  cramping pain with Zocor so we stopped his  Patient is going to start seeing an oral surgeon Dr. Amanda Lin     No contraindication to removing her teeth from cardiovascular standpoint   patient was advised of the side effects of the medications and watch for any change in medical status if any of those side effects develop to call immediately and may consider  discontinuing the medicine after talking to us and  depending on the clinical scenario    Patient was advised to return in 6 to 12  months for follow up or sooner if there is any change in care.     Electronically signed by Steffanie Sicard, DO on 2/13/2018 at 2:51 PM

## 2018-02-28 ENCOUNTER — TELEPHONE (OUTPATIENT)
Dept: CARDIOLOGY CLINIC | Age: 58
End: 2018-02-28

## 2018-03-08 NOTE — TELEPHONE ENCOUNTER
Opened by: Rito Church CNP           on Wed Feb 28, 2018  4:02 PM        Doned by: Rito Church CNP           on Wed Feb 28, 2018  4:02 PM

## 2018-04-12 PROBLEM — Z01.810 PREOP CARDIOVASCULAR EXAM: Status: RESOLVED | Noted: 2017-10-30 | Resolved: 2018-04-12

## 2018-08-07 DIAGNOSIS — R56.9 PARTIAL SEIZURE (HCC): Primary | ICD-10-CM

## 2018-08-07 RX ORDER — PHENYTOIN SODIUM 100 MG/1
300 CAPSULE, EXTENDED RELEASE ORAL 2 TIMES DAILY
Qty: 180 CAPSULE | Refills: 3 | Status: SHIPPED | OUTPATIENT
Start: 2018-08-07 | End: 2018-11-02 | Stop reason: ALTCHOICE

## 2018-08-20 ENCOUNTER — OFFICE VISIT (OUTPATIENT)
Dept: NEUROLOGY | Age: 58
End: 2018-08-20
Payer: COMMERCIAL

## 2018-08-20 VITALS
WEIGHT: 293 LBS | SYSTOLIC BLOOD PRESSURE: 142 MMHG | BODY MASS INDEX: 45.99 KG/M2 | HEART RATE: 64 BPM | DIASTOLIC BLOOD PRESSURE: 82 MMHG | HEIGHT: 67 IN

## 2018-08-20 DIAGNOSIS — G40.909 SEIZURE DISORDER (HCC): Primary | ICD-10-CM

## 2018-08-20 PROCEDURE — 99213 OFFICE O/P EST LOW 20 MIN: CPT | Performed by: PSYCHIATRY & NEUROLOGY

## 2018-08-20 RX ORDER — LACOSAMIDE 50 MG/1
50 TABLET ORAL 2 TIMES DAILY
Qty: 60 TABLET | Refills: 1 | Status: SHIPPED | OUTPATIENT
Start: 2018-08-20 | End: 2018-09-26

## 2018-08-20 NOTE — PROGRESS NOTES
NEUROLOGY OUT PATIENT FOLLOW UP NOTE:  8/20/20183:58 PM    Brian Ibrahim is here for follow up for     Patient Active Problem List   Diagnosis    Partial seizure ([facility] [address]    Intercostal pain    Angina, class II ([facility] [address]    Coronary artery disease involving native coronary artery RCAdistal 50% mild the rest       Follow up for partial seizures. She comes in by herself. No seizures reported. However, she is experiencing auras. She is taking Keppra, Dilantin and Phenytek (that is currently on back order). She is compliant with her medications. Denies missing any doses. She is here to go over lab results. ROS:    Cardiac: no chest pain. No palpitations. Renal : no flank pain, no hematuria    Skin: no rash    Reviewed labs since last evaluation and discussed with patient. Allergies   Allergen Reactions    Zocor [Simvastatin] Other (See Comments)     Muscle achiness      Atorvastatin     Gabapentin Other (See Comments)     DOESN'T LIKE THE WAY IT MAKES HER FEEL. Current Outpatient Prescriptions:     phenytoin (DILANTIN) 100 MG ER capsule, Take 3 capsules by mouth 2 times daily, Disp: 180 capsule, Rfl: 3    metoprolol tartrate (LOPRESSOR) 25 MG tablet, Take 1 tablet by mouth 2 times daily, Disp: 180 tablet, Rfl: 3    levETIRAcetam (KEPPRA) 750 MG tablet, Take 1 tablet by mouth 2 times daily, Disp: 60 tablet, Rfl: 6    phenytoin (DILANTIN) 100 MG ER capsule, TAKE ONE CAPSULE BY MOUTH ONCE DAILY, Disp: 30 capsule, Rfl: 6    aspirin 325 MG tablet, Take 325 mg by mouth daily, Disp: , Rfl:     Multiple Vitamin (MULTIVITAMIN PO), Take  by mouth daily.   , Disp: , Rfl:       PE:     Vitals:    08/20/18 1518   BP: (!) 142/82   Site: Right Arm   Position: Sitting   Cuff Size: Medium Adult   Pulse: 64   Weight: (!) 382 lb (173.3 kg)   Height: 5' 7\" (1.702 m)        General Appearance:  awake, alert, oriented, in no acute distress, and obese  Skin:  Skin color, texture, turgor normal. No rashes or lesions. Gen: Language is Intact. Head: no icterus  Neck: There is no carotid bruits. The Neck is supple. Neuro: CN 2-12 grossly intact with no focal deficits. Power 5/5 Throughout symmetric, Reflexes are decreased. Long tracts are intact. Cerebellar exam is Intact. Sensory exam is intact to light touch. Gait is intact. Musculoskeletal:  Has no hand arthritis, no limitation of ROM in any of the four extremities.   Lower extremities + 1 edema      DATA:  Results for orders placed or performed during the hospital encounter of 11/27/17   CBC   Result Value Ref Range    WBC 8.1 4.8 - 10.8 thou/mm3    RBC 4.47 4.20 - 5.40 mill/mm3    Hemoglobin 13.9 12.0 - 16.0 gm/dl    Hematocrit 41.4 37.0 - 47.0 %    MCV 92.6 81.0 - 99.0 fL    MCH 31.1 (H) 27.0 - 31.0 pg    MCHC 33.6 33.0 - 37.0 gm/dl    RDW 14.0 11.5 - 14.5 %    Platelets 732 968 - 849 thou/mm3    MPV 8.4 7.4 - 10.4 mcm   Basic metabolic panel   Result Value Ref Range    Sodium 139 135 - 145 meq/L    Potassium 4.4 3.5 - 5.2 meq/L    Chloride 100 98 - 111 meq/L    CO2 27 23 - 33 meq/L    Glucose 94 70 - 108 mg/dL    BUN 15 7 - 22 mg/dL    CREATININE 0.8 0.4 - 1.2 mg/dL    Calcium 8.8 8.5 - 10.5 mg/dL   Lipid panel   Result Value Ref Range    Cholesterol, Total 177 100 - 199 mg/dL    Triglycerides 135 0 - 199 mg/dL    HDL 57 mg/dL    LDL Calculated 93 mg/dL   Anion Gap   Result Value Ref Range    Anion Gap 12.0 8.0 - 16.0 meq/L   Glomerular Filtration Rate, Estimated   Result Value Ref Range    Est, Glom Filt Rate 74 (A) ml/min/1.73m2   Electrocardiogram, 12-lead    Result Value Ref Range    Ventricular Rate 61 BPM    Atrial Rate 61 BPM    P-R Interval 192 ms    QRS Duration 94 ms    Q-T Interval 428 ms    QTc Calculation (Bazett) 430 ms    P Axis -17 degrees    R Axis -17 degrees    T Axis -9 degrees   TYPE AND SCREEN   Result Value Ref Range    ABO O     Rh Factor POS     Antibody Screen NEG        Dilantin level 15.6  CBC and hepatic panel: normal Assessment:     Diagnosis Orders   1. Seizure disorder ([facility] [address]        Follow-up for partial seizure. She reports no seizures. She had auras and could not go to sleep. Her Phenytek is on factory backorder and she was changed to regular dilantin and Keppra. She is compliant with her medications. She is doing well. She is not taking calcium and vitamin D. Since she was experiencing Auras she would need to stay off Driving. After detailed discussion with patient and her family we agreed on the following plan. Plan:  1. Start Vimpat 50 mg twice a day. 2. Hold off on Driving. 3. Continue Dilantin, Keppra. Refills given. 4. CBC, Hepatic panel, Dilantin level  5. Report any seizures or spells  6. Follow up in 4 weeks. 7. Call if any questions or concerns. Please call if any questions.      Stacy Joy MD

## 2018-09-05 DIAGNOSIS — R56.9 PARTIAL SEIZURE (HCC): ICD-10-CM

## 2018-09-06 ENCOUNTER — TELEPHONE (OUTPATIENT)
Dept: NEUROLOGY | Age: 58
End: 2018-09-06

## 2018-09-06 DIAGNOSIS — G40.909 SEIZURE DISORDER (HCC): Primary | ICD-10-CM

## 2018-09-06 RX ORDER — LEVETIRACETAM 750 MG/1
TABLET ORAL
Qty: 60 TABLET | Refills: 6 | Status: SHIPPED | OUTPATIENT
Start: 2018-09-06 | End: 2019-03-23 | Stop reason: SDUPTHER

## 2018-09-11 ENCOUNTER — TELEPHONE (OUTPATIENT)
Dept: NEUROLOGY | Age: 58
End: 2018-09-11

## 2018-09-14 ENCOUNTER — HOSPITAL ENCOUNTER (OUTPATIENT)
Dept: GENERAL RADIOLOGY | Age: 58
Discharge: HOME | End: 2018-09-14
Payer: COMMERCIAL

## 2018-09-14 ENCOUNTER — HOSPITAL ENCOUNTER (OUTPATIENT)
Age: 58
Discharge: HOME | End: 2018-09-14
Payer: COMMERCIAL

## 2018-09-14 DIAGNOSIS — N95.0 POSTMENOPAUSAL BLEEDING: ICD-10-CM

## 2018-09-14 LAB
BASOPHILS # BLD: 0.4 %
BASOPHILS ABSOLUTE: 0 THOU/MM3 (ref 0–0.1)
EKG ATRIAL RATE: 77 BPM
EKG P AXIS: 58 DEGREES
EKG P-R INTERVAL: 178 MS
EKG Q-T INTERVAL: 386 MS
EKG QRS DURATION: 88 MS
EKG QTC CALCULATION (BAZETT): 436 MS
EKG R AXIS: -25 DEGREES
EKG T AXIS: 20 DEGREES
EKG VENTRICULAR RATE: 77 BPM
EOSINOPHIL # BLD: 2.8 %
EOSINOPHILS ABSOLUTE: 0.3 THOU/MM3 (ref 0–0.4)
ERYTHROCYTE [DISTWIDTH] IN BLOOD BY AUTOMATED COUNT: 12 % (ref 11.5–14.5)
ERYTHROCYTE [DISTWIDTH] IN BLOOD BY AUTOMATED COUNT: 41.9 FL (ref 35–45)
HCT VFR BLD CALC: 43.4 % (ref 37–47)
HEMOGLOBIN: 14.7 GM/DL (ref 12–16)
IMMATURE GRANS (ABS): 0.03 THOU/MM3 (ref 0–0.07)
IMMATURE GRANULOCYTES: 0.3 %
LYMPHOCYTES # BLD: 27.3 %
LYMPHOCYTES ABSOLUTE: 2.5 THOU/MM3 (ref 1–4.8)
MCH RBC QN AUTO: 32.2 PG (ref 26–33)
MCHC RBC AUTO-ENTMCNC: 33.9 GM/DL (ref 32.2–35.5)
MCV RBC AUTO: 95 FL (ref 81–99)
MONOCYTES # BLD: 10.7 %
MONOCYTES ABSOLUTE: 1 THOU/MM3 (ref 0.4–1.3)
NUCLEATED RED BLOOD CELLS: 0 /100 WBC
PLATELET # BLD: 281 THOU/MM3 (ref 130–400)
PMV BLD AUTO: 10 FL (ref 9.4–12.4)
POTASSIUM SERPL-SCNC: 4.3 MEQ/L (ref 3.5–5.2)
RBC # BLD: 4.57 MILL/MM3 (ref 4.2–5.4)
SEG NEUTROPHILS: 58.5 %
SEGMENTED NEUTROPHILS ABSOLUTE COUNT: 5.3 THOU/MM3 (ref 1.8–7.7)
WBC # BLD: 9.1 THOU/MM3 (ref 4.8–10.8)

## 2018-09-14 PROCEDURE — 85025 COMPLETE CBC W/AUTO DIFF WBC: CPT

## 2018-09-14 PROCEDURE — 71046 X-RAY EXAM CHEST 2 VIEWS: CPT

## 2018-09-14 PROCEDURE — 93010 ELECTROCARDIOGRAM REPORT: CPT | Performed by: INTERNAL MEDICINE

## 2018-09-14 PROCEDURE — 93005 ELECTROCARDIOGRAM TRACING: CPT | Performed by: OBSTETRICS & GYNECOLOGY

## 2018-09-14 PROCEDURE — 36415 COLL VENOUS BLD VENIPUNCTURE: CPT

## 2018-09-14 PROCEDURE — 84132 ASSAY OF SERUM POTASSIUM: CPT

## 2018-09-20 ENCOUNTER — OFFICE VISIT (OUTPATIENT)
Dept: CARDIOLOGY CLINIC | Age: 58
End: 2018-09-20
Payer: COMMERCIAL

## 2018-09-20 VITALS
SYSTOLIC BLOOD PRESSURE: 172 MMHG | HEIGHT: 67 IN | HEART RATE: 72 BPM | BODY MASS INDEX: 45.99 KG/M2 | WEIGHT: 293 LBS | DIASTOLIC BLOOD PRESSURE: 81 MMHG

## 2018-09-20 DIAGNOSIS — R60.0 BILATERAL LOWER EXTREMITY EDEMA: Primary | ICD-10-CM

## 2018-09-20 DIAGNOSIS — Z01.810 PREOPERATIVE CARDIOVASCULAR EXAMINATION: ICD-10-CM

## 2018-09-20 PROCEDURE — 99204 OFFICE O/P NEW MOD 45 MIN: CPT | Performed by: INTERNAL MEDICINE

## 2018-09-20 RX ORDER — FUROSEMIDE 40 MG/1
40 TABLET ORAL DAILY
Qty: 30 TABLET | Refills: 11 | Status: CANCELLED | OUTPATIENT
Start: 2018-09-20

## 2018-09-20 RX ORDER — FUROSEMIDE 40 MG/1
40 TABLET ORAL DAILY
COMMUNITY
End: 2018-09-21 | Stop reason: SDUPTHER

## 2018-09-20 NOTE — PROGRESS NOTES
does not drink alcohol or use drugs. Family History  Larry Richardson family history includes Cancer in her mother; Diabetes in her maternal grandfather and maternal grandmother; Heart Disease in her father, maternal grandfather, and maternal grandmother; High Blood Pressure in her father and mother. There is no family history of bicuspid aortic valve, aneurysms, heart transplant, pacemakers, defibrillators, or sudden cardiac death. Past Surgical History   Past Surgical History:   Procedure Laterality Date    CARDIAC CATHETERIZATION      DILATION AND CURETTAGE OF UTERUS      KIDNEY STONE SURGERY      LITHOTRIPSY      TONSILLECTOMY AND ADENOIDECTOMY         Review of Systems   Constitutional: Negative for chills and fever  HENT: Negative for congestion, sinus pressure, sneezing and sore throat. Eyes: Negative for pain, discharge, redness and itching. Respiratory: Negative for apnea, cough  Gastrointestinal: Negative for blood in stool, constipation, diarrhea   Endocrine: Negative for cold intolerance, heat intolerance, polydipsia. Genitourinary: Negative for dysuria, enuresis, flank pain and hematuria. Musculoskeletal: Negative for arthralgias, joint swelling and neck pain. Neurological: Negative for numbness and headaches. Psychiatric/Behavioral: Negative for agitation, confusion, decreased concentration and dysphoric mood. Objective:     BP (!) 190/88   Pulse 72   Ht 5' 7\" (1.702 m)   Wt (!) 381 lb (172.8 kg)   BMI 59.67 kg/m²     Wt Readings from Last 3 Encounters:   09/20/18 (!) 381 lb (172.8 kg)   08/20/18 (!) 382 lb (173.3 kg)   02/13/18 (!) 369 lb (167.4 kg)     BP Readings from Last 3 Encounters:   09/20/18 (!) 190/88   08/20/18 (!) 142/82   02/13/18 136/68       Nursing note and vitals reviewed. Physical Exam   Constitutional: Oriented to person, place, and time. Appears well-developed and well-nourished. HENT:   Head: Normocephalic and atraumatic.    Eyes: EOM are normal. Pupils normal.   Trivial pulmonic regurgitation visualized. Left Atrium   Mildly dilated left atrium. Left Ventricle   Systolic function was normal.   Ejection fraction is visually estimated at 55%. The left ventricle was not well visualized. Doppler parameters were consistent with abnormal left ventricular   relaxation (grade 1 diastolic dysfunction). Right Atrium   The right atrium is of normal size. Right Ventricle   Normal right ventricular size and function. Pericardial Effusion   No evidence of any pericardial effusion. Aorta / Great Vessels   Aortic root dimension within normal limits.      M-Mode/2D Measurements & Calculations      LV Diastolic    LV Systolic Dimension: 3.9  AV Cusp Separation: 1.9 cmLA   Dimension: 5.6  cm                          Dimension: 4.2 cmAO Root   cm              LV Volume Diastolic: 531 ml Dimension: 2.7 cm   LV FS:30.4 %    LV Volume Systolic: 28.5 ml   LV PW           LV EDV/LV EDV Index: 678   Diastolic: 1 cm GJ/80 H^1FB ESV/LV ESV   Septum          Index: 65.9 ml/25 m^2       RV Diastolic Dimension: 2.4 cm   Diastolic: 1 cm EF Calculated: 57.2 %                                               LA/Aorta: 1.56     Doppler Measurements & Calculations      MV Peak E-Wave: 75.5  AV Peak Velocity: 117  LVOT Peak Velocity: 75.2 cm/s   cm/s                  cm/s                   LVOT Mean Velocity: 58.9 cm/s   MV Peak A-Wave: 90.8  AV Peak Gradient: 5.48 LVOT Peak Gradient: 2   cm/s                  mmHg                   mmHgLVOT Mean Gradient: 2   MV E/A Ratio: 0.83    AV Mean Velocity: 95.8 mmHg   MV Peak Gradient:     cm/s   2.28 mmHg             AV Mean Gradient: 4    TV Peak E-Wave: 70.1 cm/s                         mmHg                   TV Peak A-Wave: 62.2 cm/s   MV Deceleration Time: AV VTI: 26.3 cm   264 msec                                     TV Peak Gradient: 1.97 mmHg                            LVOT VTI: 15 cm        PV Peak Velocity: 92.8 cm/s   MV E' Septal          IVRT: 130 msec         PV Peak Gradient: 3.44 mmHg   Velocity: 4.78 cm/s   MV A' Septal   Velocity: 7.99 cm/s   AV DVI (VTI): 0.57AV   MV E' Lateral         DVI (Vmax):0.64   Velocity: 5.95 cm/s   MV A' Lateral   Velocity: 9.65 cm/s   E/E' septal: 15.79   E/E' lateral: 12.69     [id number]  [id number]        Assessment/Plan   Pre-operative CV exam  Possible upcoming DNC/hysteroscopy, unclear date (Intermediate to high risk surgery)  RCRI = 1 (Low to intermediate risk patient)    She has GARRISON. She bilateral LE edema. Able to do > 4 METS. Patient accepts the risk of the planned procedure and understands the possibility of laurie-operative cardiac event, including but not limited to MI, VT/VF, cardiac arrest, and death, and wishes to proceed with the procedure. Check TTE prior to procedure to understand LV function and to figure out management of BP. Cath 2017 without obstruction.       Disposition:  6 months post procedure         Electronically signed by Yue Munoz MD   9/20/2018 at 12:06 PM

## 2018-09-22 RX ORDER — FUROSEMIDE 40 MG/1
40 TABLET ORAL DAILY
Qty: 30 TABLET | Refills: 11 | Status: SHIPPED | OUTPATIENT
Start: 2018-09-22 | End: 2019-09-20

## 2018-09-26 ENCOUNTER — OFFICE VISIT (OUTPATIENT)
Dept: NEUROLOGY | Age: 58
End: 2018-09-26
Payer: COMMERCIAL

## 2018-09-26 ENCOUNTER — HOSPITAL ENCOUNTER (OUTPATIENT)
Dept: NON INVASIVE DIAGNOSTICS | Age: 58
Discharge: HOME | End: 2018-09-26
Payer: COMMERCIAL

## 2018-09-26 VITALS
HEART RATE: 90 BPM | SYSTOLIC BLOOD PRESSURE: 132 MMHG | WEIGHT: 293 LBS | DIASTOLIC BLOOD PRESSURE: 88 MMHG | BODY MASS INDEX: 45.99 KG/M2 | HEIGHT: 67 IN

## 2018-09-26 DIAGNOSIS — G40.909 SEIZURE DISORDER (HCC): Primary | ICD-10-CM

## 2018-09-26 DIAGNOSIS — R60.0 BILATERAL LOWER EXTREMITY EDEMA: ICD-10-CM

## 2018-09-26 LAB
LV EF: 58 %
LVEF MODALITY: NORMAL

## 2018-09-26 PROCEDURE — 99213 OFFICE O/P EST LOW 20 MIN: CPT | Performed by: PSYCHIATRY & NEUROLOGY

## 2018-09-26 PROCEDURE — 93306 TTE W/DOPPLER COMPLETE: CPT

## 2018-09-26 RX ORDER — LACOSAMIDE 100 MG/1
100 TABLET ORAL 2 TIMES DAILY
Qty: 60 TABLET | Refills: 5 | Status: SHIPPED | OUTPATIENT
Start: 2018-09-26 | End: 2018-12-19

## 2018-09-26 NOTE — PROGRESS NOTES
ms    P Axis 58 degrees    R Axis -25 degrees    T Axis 20 degrees       Dilantin level 15.6  CBC and hepatic panel: normal     Assessment:     Diagnosis Orders   1. Seizure disorder ([address]        Follow-up for partial seizure. She reports no seizures. She is on Vimpat, Dilantin, Keppra. She is interested in stopping the Dilantin. She is compliant with her medications. She is not driving or experiencing any spells. She is not taking calcium and vitamin D. Since she was experiencing Auras she would need to stay off Driving. After detailed discussion with patient and her family we agreed on the following plan. Plan:  1. Change Vimpat to 100 mg twice a day. 2. Change Dilantin to 200 mg twice a day for 7 days, then 100 mg three times a day fpr 7 days, then 100 daily for one week, then stop. 3. Continue with Keppra at current dose. 4. Hold off on Driving. 5. Report any seizures or spells  6. Follow up in 4 weeks. 7. Call if any questions or concerns. Please call if any questions.      Rogers Kussmaul MD

## 2018-10-01 LAB
BUN BLDV-MCNC: 14 MG/DL
CALCIUM SERPL-MCNC: 8.7 MG/DL
CHLORIDE BLD-SCNC: 102 MMOL/L
CO2: 32 MMOL/L
CREAT SERPL-MCNC: 1 MG/DL
GFR CALCULATED: NORMAL
GLUCOSE BLD-MCNC: 130 MG/DL
POTASSIUM SERPL-SCNC: 3.8 MMOL/L
SODIUM BLD-SCNC: 140 MMOL/L

## 2018-10-05 ENCOUNTER — PATIENT MESSAGE (OUTPATIENT)
Dept: CARDIOLOGY CLINIC | Age: 58
End: 2018-10-05

## 2018-10-22 ENCOUNTER — TELEPHONE (OUTPATIENT)
Dept: NEUROLOGY | Age: 58
End: 2018-10-22

## 2018-10-22 NOTE — TELEPHONE ENCOUNTER
Patient sent my chart encounter stating last dose of Dilantin was last Wednesday and she is still having dianne manuel episodes but now experiencing white light flashing in visual ramsay. Spoke with Dr Ruthann Kemp who stated to start back on Dilantin at 100 mg 2 times daily and continue with Vimpat and Keppra as previously instructed. Patient notified and verbalized understanding.

## 2018-11-09 ENCOUNTER — HOSPITAL ENCOUNTER (OUTPATIENT)
Age: 58
Setting detail: OUTPATIENT SURGERY
Discharge: HOME | End: 2018-11-09
Attending: OBSTETRICS & GYNECOLOGY | Admitting: OBSTETRICS & GYNECOLOGY
Payer: COMMERCIAL

## 2018-11-09 ENCOUNTER — ANESTHESIA (OUTPATIENT)
Dept: OPERATING ROOM | Age: 58
End: 2018-11-09
Payer: COMMERCIAL

## 2018-11-09 ENCOUNTER — ANESTHESIA EVENT (OUTPATIENT)
Dept: OPERATING ROOM | Age: 58
End: 2018-11-09
Payer: COMMERCIAL

## 2018-11-09 VITALS
TEMPERATURE: 98.6 F | OXYGEN SATURATION: 94 % | DIASTOLIC BLOOD PRESSURE: 63 MMHG | RESPIRATION RATE: 2 BRPM | SYSTOLIC BLOOD PRESSURE: 131 MMHG

## 2018-11-09 VITALS
WEIGHT: 293 LBS | HEIGHT: 67 IN | OXYGEN SATURATION: 100 % | DIASTOLIC BLOOD PRESSURE: 71 MMHG | BODY MASS INDEX: 45.99 KG/M2 | RESPIRATION RATE: 16 BRPM | SYSTOLIC BLOOD PRESSURE: 137 MMHG | HEART RATE: 61 BPM | TEMPERATURE: 96.5 F

## 2018-11-09 DIAGNOSIS — Z98.890 S/P D&C (STATUS POST DILATION AND CURETTAGE): Primary | ICD-10-CM

## 2018-11-09 PROBLEM — N95.0 POST-MENOPAUSAL BLEEDING: Status: ACTIVE | Noted: 2018-11-09

## 2018-11-09 LAB
POTASSIUM SERPL-SCNC: 4.3 MEQ/L (ref 3.5–5.2)
PREGNANCY, URINE: NEGATIVE

## 2018-11-09 PROCEDURE — 6360000002 HC RX W HCPCS: Performed by: SPECIALIST

## 2018-11-09 PROCEDURE — 3700000000 HC ANESTHESIA ATTENDED CARE: Performed by: OBSTETRICS & GYNECOLOGY

## 2018-11-09 PROCEDURE — 2580000003 HC RX 258: Performed by: OBSTETRICS & GYNECOLOGY

## 2018-11-09 PROCEDURE — 84132 ASSAY OF SERUM POTASSIUM: CPT

## 2018-11-09 PROCEDURE — 7100000000 HC PACU RECOVERY - FIRST 15 MIN: Performed by: OBSTETRICS & GYNECOLOGY

## 2018-11-09 PROCEDURE — 81025 URINE PREGNANCY TEST: CPT

## 2018-11-09 PROCEDURE — 2500000003 HC RX 250 WO HCPCS: Performed by: SPECIALIST

## 2018-11-09 PROCEDURE — 3600000013 HC SURGERY LEVEL 3 ADDTL 15MIN: Performed by: OBSTETRICS & GYNECOLOGY

## 2018-11-09 PROCEDURE — 6370000000 HC RX 637 (ALT 250 FOR IP): Performed by: OBSTETRICS & GYNECOLOGY

## 2018-11-09 PROCEDURE — 3700000001 HC ADD 15 MINUTES (ANESTHESIA): Performed by: OBSTETRICS & GYNECOLOGY

## 2018-11-09 PROCEDURE — 7100000011 HC PHASE II RECOVERY - ADDTL 15 MIN: Performed by: OBSTETRICS & GYNECOLOGY

## 2018-11-09 PROCEDURE — 36415 COLL VENOUS BLD VENIPUNCTURE: CPT

## 2018-11-09 PROCEDURE — 3600000003 HC SURGERY LEVEL 3 BASE: Performed by: OBSTETRICS & GYNECOLOGY

## 2018-11-09 PROCEDURE — 7100000010 HC PHASE II RECOVERY - FIRST 15 MIN: Performed by: OBSTETRICS & GYNECOLOGY

## 2018-11-09 PROCEDURE — 2709999900 HC NON-CHARGEABLE SUPPLY: Performed by: OBSTETRICS & GYNECOLOGY

## 2018-11-09 PROCEDURE — 7100000001 HC PACU RECOVERY - ADDTL 15 MIN: Performed by: OBSTETRICS & GYNECOLOGY

## 2018-11-09 PROCEDURE — 88305 TISSUE EXAM BY PATHOLOGIST: CPT

## 2018-11-09 RX ORDER — SODIUM CHLORIDE 0.9 % (FLUSH) 0.9 %
10 SYRINGE (ML) INJECTION PRN
Status: DISCONTINUED | OUTPATIENT
Start: 2018-11-09 | End: 2018-11-09 | Stop reason: HOSPADM

## 2018-11-09 RX ORDER — LIDOCAINE HYDROCHLORIDE 20 MG/ML
INJECTION, SOLUTION INFILTRATION; PERINEURAL PRN
Status: DISCONTINUED | OUTPATIENT
Start: 2018-11-09 | End: 2018-11-09 | Stop reason: SDUPTHER

## 2018-11-09 RX ORDER — IBUPROFEN 400 MG/1
800 TABLET ORAL EVERY 8 HOURS PRN
Status: DISCONTINUED | OUTPATIENT
Start: 2018-11-09 | End: 2018-11-09 | Stop reason: HOSPADM

## 2018-11-09 RX ORDER — FENTANYL CITRATE 50 UG/ML
50 INJECTION, SOLUTION INTRAMUSCULAR; INTRAVENOUS EVERY 5 MIN PRN
Status: DISCONTINUED | OUTPATIENT
Start: 2018-11-09 | End: 2018-11-09 | Stop reason: HOSPADM

## 2018-11-09 RX ORDER — PROMETHAZINE HYDROCHLORIDE 25 MG/ML
12.5 INJECTION, SOLUTION INTRAMUSCULAR; INTRAVENOUS
Status: DISCONTINUED | OUTPATIENT
Start: 2018-11-09 | End: 2018-11-09 | Stop reason: HOSPADM

## 2018-11-09 RX ORDER — KETOROLAC TROMETHAMINE 30 MG/ML
INJECTION, SOLUTION INTRAMUSCULAR; INTRAVENOUS PRN
Status: DISCONTINUED | OUTPATIENT
Start: 2018-11-09 | End: 2018-11-09 | Stop reason: SDUPTHER

## 2018-11-09 RX ORDER — PROPOFOL 10 MG/ML
INJECTION, EMULSION INTRAVENOUS PRN
Status: DISCONTINUED | OUTPATIENT
Start: 2018-11-09 | End: 2018-11-09 | Stop reason: SDUPTHER

## 2018-11-09 RX ORDER — ONDANSETRON 2 MG/ML
4 INJECTION INTRAMUSCULAR; INTRAVENOUS EVERY 6 HOURS PRN
Status: DISCONTINUED | OUTPATIENT
Start: 2018-11-09 | End: 2018-11-09 | Stop reason: HOSPADM

## 2018-11-09 RX ORDER — DEXAMETHASONE SODIUM PHOSPHATE 4 MG/ML
INJECTION, SOLUTION INTRA-ARTICULAR; INTRALESIONAL; INTRAMUSCULAR; INTRAVENOUS; SOFT TISSUE PRN
Status: DISCONTINUED | OUTPATIENT
Start: 2018-11-09 | End: 2018-11-09 | Stop reason: SDUPTHER

## 2018-11-09 RX ORDER — DOCUSATE SODIUM 100 MG/1
100 CAPSULE, LIQUID FILLED ORAL 2 TIMES DAILY
Status: DISCONTINUED | OUTPATIENT
Start: 2018-11-09 | End: 2018-11-09 | Stop reason: HOSPADM

## 2018-11-09 RX ORDER — GLYCOPYRROLATE 1 MG/5 ML
SYRINGE (ML) INTRAVENOUS PRN
Status: DISCONTINUED | OUTPATIENT
Start: 2018-11-09 | End: 2018-11-09 | Stop reason: SDUPTHER

## 2018-11-09 RX ORDER — SODIUM CHLORIDE 0.9 % (FLUSH) 0.9 %
10 SYRINGE (ML) INJECTION EVERY 12 HOURS SCHEDULED
Status: DISCONTINUED | OUTPATIENT
Start: 2018-11-09 | End: 2018-11-09 | Stop reason: HOSPADM

## 2018-11-09 RX ORDER — LABETALOL HYDROCHLORIDE 5 MG/ML
5 INJECTION, SOLUTION INTRAVENOUS EVERY 10 MIN PRN
Status: DISCONTINUED | OUTPATIENT
Start: 2018-11-09 | End: 2018-11-09 | Stop reason: HOSPADM

## 2018-11-09 RX ORDER — SUCCINYLCHOLINE/SOD CL,ISO/PF 100 MG/5ML
SYRINGE (ML) INTRAVENOUS PRN
Status: DISCONTINUED | OUTPATIENT
Start: 2018-11-09 | End: 2018-11-09 | Stop reason: SDUPTHER

## 2018-11-09 RX ORDER — OXYCODONE HYDROCHLORIDE AND ACETAMINOPHEN 5; 325 MG/1; MG/1
2 TABLET ORAL EVERY 4 HOURS PRN
Status: DISCONTINUED | OUTPATIENT
Start: 2018-11-09 | End: 2018-11-09 | Stop reason: HOSPADM

## 2018-11-09 RX ORDER — FENTANYL CITRATE 50 UG/ML
25 INJECTION, SOLUTION INTRAMUSCULAR; INTRAVENOUS EVERY 5 MIN PRN
Status: DISCONTINUED | OUTPATIENT
Start: 2018-11-09 | End: 2018-11-09 | Stop reason: HOSPADM

## 2018-11-09 RX ORDER — FENTANYL CITRATE 50 UG/ML
INJECTION, SOLUTION INTRAMUSCULAR; INTRAVENOUS PRN
Status: DISCONTINUED | OUTPATIENT
Start: 2018-11-09 | End: 2018-11-09 | Stop reason: SDUPTHER

## 2018-11-09 RX ORDER — METOCLOPRAMIDE HYDROCHLORIDE 5 MG/ML
10 INJECTION INTRAMUSCULAR; INTRAVENOUS
Status: DISCONTINUED | OUTPATIENT
Start: 2018-11-09 | End: 2018-11-09 | Stop reason: HOSPADM

## 2018-11-09 RX ORDER — DIPHENHYDRAMINE HYDROCHLORIDE 50 MG/ML
12.5 INJECTION INTRAMUSCULAR; INTRAVENOUS
Status: DISCONTINUED | OUTPATIENT
Start: 2018-11-09 | End: 2018-11-09 | Stop reason: HOSPADM

## 2018-11-09 RX ORDER — SODIUM CHLORIDE 9 MG/ML
INJECTION, SOLUTION INTRAVENOUS CONTINUOUS
Status: DISCONTINUED | OUTPATIENT
Start: 2018-11-09 | End: 2018-11-09 | Stop reason: HOSPADM

## 2018-11-09 RX ORDER — ONDANSETRON 2 MG/ML
INJECTION INTRAMUSCULAR; INTRAVENOUS PRN
Status: DISCONTINUED | OUTPATIENT
Start: 2018-11-09 | End: 2018-11-09 | Stop reason: SDUPTHER

## 2018-11-09 RX ORDER — MEPERIDINE HYDROCHLORIDE 25 MG/ML
12.5 INJECTION INTRAMUSCULAR; INTRAVENOUS; SUBCUTANEOUS EVERY 5 MIN PRN
Status: DISCONTINUED | OUTPATIENT
Start: 2018-11-09 | End: 2018-11-09 | Stop reason: HOSPADM

## 2018-11-09 RX ORDER — OXYCODONE HYDROCHLORIDE AND ACETAMINOPHEN 5; 325 MG/1; MG/1
1 TABLET ORAL EVERY 4 HOURS PRN
Status: DISCONTINUED | OUTPATIENT
Start: 2018-11-09 | End: 2018-11-09 | Stop reason: HOSPADM

## 2018-11-09 RX ORDER — HYDROCODONE BITARTRATE AND ACETAMINOPHEN 5; 325 MG/1; MG/1
1 TABLET ORAL EVERY 6 HOURS PRN
Qty: 10 TABLET | Refills: 0 | Status: SHIPPED | OUTPATIENT
Start: 2018-11-09 | End: 2018-11-12

## 2018-11-09 RX ADMIN — Medication 0.1 MG: at 07:35

## 2018-11-09 RX ADMIN — OXYCODONE AND ACETAMINOPHEN 1 TABLET: 5; 325 TABLET ORAL at 09:19

## 2018-11-09 RX ADMIN — KETOROLAC TROMETHAMINE 30 MG: 30 INJECTION, SOLUTION INTRAMUSCULAR; INTRAVENOUS at 08:06

## 2018-11-09 RX ADMIN — FENTANYL CITRATE 50 MCG: 50 INJECTION INTRAMUSCULAR; INTRAVENOUS at 07:35

## 2018-11-09 RX ADMIN — PHENYLEPHRINE HYDROCHLORIDE 100 MCG: 10 INJECTION INTRAVENOUS at 07:54

## 2018-11-09 RX ADMIN — DEXAMETHASONE SODIUM PHOSPHATE 4 MG: 4 INJECTION, SOLUTION INTRAMUSCULAR; INTRAVENOUS at 07:47

## 2018-11-09 RX ADMIN — FENTANYL CITRATE 50 MCG: 50 INJECTION INTRAMUSCULAR; INTRAVENOUS at 08:19

## 2018-11-09 RX ADMIN — Medication 140 MG: at 07:36

## 2018-11-09 RX ADMIN — SODIUM CHLORIDE: 9 INJECTION, SOLUTION INTRAVENOUS at 06:40

## 2018-11-09 RX ADMIN — LIDOCAINE HYDROCHLORIDE 100 MG: 20 INJECTION, SOLUTION INFILTRATION; PERINEURAL at 07:36

## 2018-11-09 RX ADMIN — PROPOFOL 300 MG: 10 INJECTION, EMULSION INTRAVENOUS at 07:36

## 2018-11-09 RX ADMIN — ONDANSETRON HYDROCHLORIDE 4 MG: 4 INJECTION, SOLUTION INTRAMUSCULAR; INTRAVENOUS at 07:47

## 2018-11-09 ASSESSMENT — PULMONARY FUNCTION TESTS
PIF_VALUE: 17
PIF_VALUE: 24
PIF_VALUE: 27
PIF_VALUE: 17
PIF_VALUE: 5
PIF_VALUE: 25
PIF_VALUE: 27
PIF_VALUE: 25
PIF_VALUE: 29
PIF_VALUE: 1
PIF_VALUE: 17
PIF_VALUE: 28
PIF_VALUE: 18
PIF_VALUE: 1
PIF_VALUE: 1
PIF_VALUE: 0
PIF_VALUE: 17
PIF_VALUE: 0
PIF_VALUE: 45
PIF_VALUE: 25
PIF_VALUE: 27
PIF_VALUE: 17
PIF_VALUE: 27
PIF_VALUE: 0
PIF_VALUE: 0
PIF_VALUE: 29
PIF_VALUE: 17
PIF_VALUE: 17
PIF_VALUE: 0
PIF_VALUE: 27
PIF_VALUE: 0
PIF_VALUE: 28
PIF_VALUE: 26
PIF_VALUE: 27
PIF_VALUE: 2
PIF_VALUE: 28
PIF_VALUE: 25
PIF_VALUE: 25
PIF_VALUE: 0
PIF_VALUE: 30
PIF_VALUE: 17
PIF_VALUE: 18
PIF_VALUE: 28
PIF_VALUE: 7
PIF_VALUE: 27
PIF_VALUE: 25
PIF_VALUE: 3
PIF_VALUE: 18
PIF_VALUE: 12
PIF_VALUE: 25
PIF_VALUE: 1
PIF_VALUE: 18
PIF_VALUE: 28

## 2018-11-09 ASSESSMENT — PAIN SCALES - GENERAL
PAINLEVEL_OUTOF10: 8
PAINLEVEL_OUTOF10: 8
PAINLEVEL_OUTOF10: 5
PAINLEVEL_OUTOF10: 3
PAINLEVEL_OUTOF10: 3
PAINLEVEL_OUTOF10: 7
PAINLEVEL_OUTOF10: 7
PAINLEVEL_OUTOF10: 8

## 2018-11-09 ASSESSMENT — PAIN DESCRIPTION - LOCATION
LOCATION: BACK
LOCATION: PELVIS
LOCATION: BACK
LOCATION: BACK

## 2018-11-09 ASSESSMENT — PAIN DESCRIPTION - PAIN TYPE
TYPE: SURGICAL PAIN

## 2018-11-09 ASSESSMENT — PAIN DESCRIPTION - DESCRIPTORS
DESCRIPTORS: CRAMPING
DESCRIPTORS: CRAMPING

## 2018-11-09 ASSESSMENT — PAIN - FUNCTIONAL ASSESSMENT: PAIN_FUNCTIONAL_ASSESSMENT: 0-10

## 2018-11-09 NOTE — PROGRESS NOTES
Discharge criteria was met. Patient stated understanding discharge instructions. prescription given to patient. Left via Wheelchair. Assessment of Surgical site no drainage from incisions site.

## 2018-11-09 NOTE — ANESTHESIA PRE PROCEDURE
clear to auscultation  (+) decreased breath sounds,                             Cardiovascular:    (+) CAD:, hyperlipidemia        Rhythm: regular  Rate: normal                    Neuro/Psych:   (+) seizures:,             GI/Hepatic/Renal:   (+) renal disease:, morbid obesity          Endo/Other:                     Abdominal:   (+) obese,         Vascular:                                        Anesthesia Plan      general     ASA 3       Induction: intravenous and rapid sequence. MIPS: Prophylactic antiemetics administered. Anesthetic plan and risks discussed with patient. Plan discussed with CRNA.                   Srinivasa Farooq MD   11/9/2018

## 2018-11-09 NOTE — PROGRESS NOTES
No drainage noted. Asking for pain medication. Will continue to monitor. Asking for pepsi and crackers with peanut butter.

## 2018-11-09 NOTE — PROGRESS NOTES
Return from recovery room. Awake. Pain 8/10. Cramping. Tolerating Pepsi po. Call light in reach. Family in the the room.

## 2018-11-14 ENCOUNTER — OFFICE VISIT (OUTPATIENT)
Dept: NEUROLOGY | Age: 58
End: 2018-11-14
Payer: COMMERCIAL

## 2018-11-14 ENCOUNTER — HOSPITAL ENCOUNTER (OUTPATIENT)
Age: 58
Discharge: HOME | End: 2018-11-14
Payer: COMMERCIAL

## 2018-11-14 VITALS
WEIGHT: 293 LBS | HEIGHT: 67 IN | HEART RATE: 86 BPM | BODY MASS INDEX: 45.99 KG/M2 | SYSTOLIC BLOOD PRESSURE: 140 MMHG | DIASTOLIC BLOOD PRESSURE: 78 MMHG

## 2018-11-14 DIAGNOSIS — G40.909 SEIZURE DISORDER (HCC): Primary | ICD-10-CM

## 2018-11-14 DIAGNOSIS — G40.909 SEIZURE DISORDER (HCC): ICD-10-CM

## 2018-11-14 LAB — PHENYTOIN LEVEL: 2.4 UG/ML (ref 6–18)

## 2018-11-14 PROCEDURE — 36415 COLL VENOUS BLD VENIPUNCTURE: CPT

## 2018-11-14 PROCEDURE — 80185 ASSAY OF PHENYTOIN TOTAL: CPT

## 2018-11-14 PROCEDURE — 99213 OFFICE O/P EST LOW 20 MIN: CPT | Performed by: PSYCHIATRY & NEUROLOGY

## 2018-11-14 NOTE — PROGRESS NOTES
the following plan. Plan:  1. Dilantin Level. 2. Epilepsy monitoring evaluation. 3. Continue with Vimpat 100 mg twice a day. 4. Continue with Dilantin 200 mg twice a day. 5. Continue with Keppra at current dose. 6. Hold off on Driving. 7. Report any seizures or spells  8. Follow up in 4 weeks. 9. Call if any questions or concerns. Please call if any questions.      Yadira Cuenca MD

## 2018-11-20 DIAGNOSIS — G40.909 SEIZURE DISORDER (HCC): Primary | ICD-10-CM

## 2018-12-18 ENCOUNTER — OFFICE VISIT (OUTPATIENT)
Dept: NEUROLOGY | Age: 58
End: 2018-12-18
Payer: COMMERCIAL

## 2018-12-18 VITALS
SYSTOLIC BLOOD PRESSURE: 176 MMHG | HEART RATE: 78 BPM | HEIGHT: 67 IN | DIASTOLIC BLOOD PRESSURE: 83 MMHG | BODY MASS INDEX: 45.99 KG/M2 | WEIGHT: 293 LBS

## 2018-12-18 DIAGNOSIS — E66.01 OBESITIES, MORBID (HCC): ICD-10-CM

## 2018-12-18 DIAGNOSIS — R56.9 SEIZURES (HCC): Primary | ICD-10-CM

## 2018-12-18 DIAGNOSIS — F32.A DEPRESSION, UNSPECIFIED DEPRESSION TYPE: ICD-10-CM

## 2018-12-18 DIAGNOSIS — I15.8 OTHER SECONDARY HYPERTENSION: ICD-10-CM

## 2018-12-18 PROCEDURE — 99245 OFF/OP CONSLTJ NEW/EST HI 55: CPT | Performed by: PSYCHIATRY & NEUROLOGY

## 2018-12-18 NOTE — PATIENT INSTRUCTIONS
1. LTME at [facility]  2. MRI brain w/wo   3. Continue current dilantin, keppra regimen  4. Increase vimpat if ok with [place].     Week 1: 150mg morning, 100mg pM  From week 2: 150mg twice a day     5. Seizure precaution including no driving for 6 months    Return after above tests.     Holger Mckenna MD, MS

## 2018-12-19 ENCOUNTER — OFFICE VISIT (OUTPATIENT)
Dept: NEUROLOGY | Age: 58
End: 2018-12-19
Payer: COMMERCIAL

## 2018-12-19 ENCOUNTER — TELEPHONE (OUTPATIENT)
Dept: NEUROLOGY | Age: 58
End: 2018-12-19

## 2018-12-19 VITALS
WEIGHT: 293 LBS | BODY MASS INDEX: 47.09 KG/M2 | HEART RATE: 76 BPM | HEIGHT: 66 IN | SYSTOLIC BLOOD PRESSURE: 148 MMHG | DIASTOLIC BLOOD PRESSURE: 84 MMHG

## 2018-12-19 DIAGNOSIS — G40.909 SEIZURE DISORDER (HCC): Primary | ICD-10-CM

## 2018-12-19 PROCEDURE — 99213 OFFICE O/P EST LOW 20 MIN: CPT | Performed by: PSYCHIATRY & NEUROLOGY

## 2018-12-19 RX ORDER — LACOSAMIDE 150 MG/1
TABLET ORAL
Qty: 120 TABLET | Refills: 1 | Status: SHIPPED | OUTPATIENT
Start: 2018-12-19 | End: 2019-02-21 | Stop reason: SDUPTHER

## 2018-12-19 NOTE — PROGRESS NOTES
NEUROLOGY OUT PATIENT FOLLOW UP NOTE:  12/19/20189:14 AM    Rxoy Christie is here for follow up for     Patient Active Problem List   Diagnosis    Partial seizure ([address]    Intercostal pain    Angina, class II ([address]    Coronary artery disease involving native coronary artery RCAdistal 50% mild the rest     Post-menopausal bleeding    S/P D&C (status post dilation and curettage)      Follow up for seizure disorder. She denies any spells. She was seen at [facility], she was recommended to undergo LTME. She was asked to increase the Vimpat to 100 mg in am and 150 mg at night for 2 weeks, then 150 mg twice a day thereafter. She is to stay on her medications. She is also on Keppra. She is compliant with her medications. She is not driving or experiencing any spells. She is not taking calcium and vitamin D. Since she was experiencing Auras she would need to stay off Driving. She is not taking calcium and vitamin D. She is here to go over plan going forward. ROS:  Cardiac: no chest pain. No palpitations. Renal : no flank pain, no hematuria    Skin: no rash    Reviewed labs since last evaluation and discussed with patient. Allergies   Allergen Reactions    Zocor [Simvastatin] Other (See Comments)     Muscle achiness      Atorvastatin     Gabapentin Other (See Comments)     DOESN'T LIKE THE WAY IT MAKES HER FEEL. Current Outpatient Prescriptions:     lacosamide (VIMPAT) 100 MG TABS tablet, Take 1 tablet by mouth 2 times daily. ., Disp: 60 tablet, Rfl: 5    furosemide (LASIX) 40 MG tablet, Take 1 tablet by mouth daily, Disp: 30 tablet, Rfl: 11    aspirin 81 MG tablet, Take 81 mg by mouth daily, Disp: , Rfl:     levETIRAcetam (KEPPRA) 750 MG tablet, TAKE ONE TABLET BY MOUTH TWICE DAILY, Disp: 60 tablet, Rfl: 6    metoprolol tartrate (LOPRESSOR) 25 MG tablet, Take 1 tablet by mouth 2 times daily, Disp: 180 tablet, Rfl: 3    phenytoin (DILANTIN) 100 MG ER capsule, TAKE ONE CAPSULE BY MOUTH ONCE DAILY (Patient taking differently: 2 times daily TAKE ONE CAPSULE BY MOUTH ONCE DAILY), Disp: 30 capsule, Rfl: 6    Multiple Vitamin (MULTIVITAMIN PO), Take  by mouth daily. , Disp: , Rfl:       PE:     Vitals:    12/19/18 0833   BP: (!) 148/84   Site: Left Upper Arm   Position: Sitting   Cuff Size: Large Adult   Pulse: 76   Weight: (!) 389 lb (176.4 kg)   Height: 5' 6\" (1.676 m)        General Appearance:  awake, alert, oriented, in no acute distress, and obese  Skin:  Skin color, texture, turgor normal. No rashes or lesions. Gen: Language is Intact. Head: no icterus  Neck: There is no carotid bruits. The Neck is supple. Neuro: CN 2-12 grossly intact with no focal deficits. Power 5/5 Throughout symmetric, Reflexes are decreased. Long tracts are intact. Cerebellar exam is Intact. Sensory exam is intact to light touch. Gait is intact. Musculoskeletal:  Has no hand arthritis, no limitation of ROM in any of the four extremities. Lower extremities + 1 edema      DATA:  Results for orders placed or performed during the hospital encounter of 11/14/18   Phenytoin Level, Total   Result Value Ref Range    Phenytoin Lvl 2.4 (L) 6.0 - 18.0 ug/mL       Dilantin level 15.6  CBC and hepatic panel: normal     Assessment:     Diagnosis Orders   1. Seizure disorder ([address]        she denies any seizure. She was seen at [facility], she was recommended to undergo LTME. She was asked to increase the Vimpat to 100 mg in am and 150 mg at night for 2 weeks, then 150 mg twice a day thereafter. She is to stay on her medications. She is also on Keppra. She is compliant with her medications. She is not driving or experiencing any spells. She is not taking calcium and vitamin D. Since she was experiencing Auras she would need to stay off Driving. It is unclear if her events are epileptic or non epileptic. She reports no change of her symptoms with seizure medication adjustment upwards.  After detailed discussion with patient and her family we agreed on the following plan. Plan:  1. Change Vimpat to 100 mg in am and 150 mg at night for two weeks, then 150 mg twice a day thereafter. 2. Follow thru with Epilepsy monitoring evaluation. 3. Continue with Dilantin 200 mg twice a day. 4. Continue with Keppra at current dose. 5. Hold off on Driving. 6. Report any seizures or spells  7. Follow up in 4 weeks. 8. Call if any questions or concerns. Please call if any questions.      Rogers Kussmaul MD

## 2019-01-14 DIAGNOSIS — R56.9 SEIZURES (HCC): ICD-10-CM

## 2019-01-15 ENCOUNTER — OFFICE VISIT (OUTPATIENT)
Dept: CARDIOLOGY CLINIC | Age: 59
End: 2019-01-15
Payer: COMMERCIAL

## 2019-01-15 VITALS
HEIGHT: 67 IN | WEIGHT: 293 LBS | BODY MASS INDEX: 45.99 KG/M2 | SYSTOLIC BLOOD PRESSURE: 140 MMHG | HEART RATE: 76 BPM | DIASTOLIC BLOOD PRESSURE: 82 MMHG

## 2019-01-15 DIAGNOSIS — E78.1 PURE HYPERGLYCERIDEMIA: ICD-10-CM

## 2019-01-15 DIAGNOSIS — I25.10 CORONARY ARTERY DISEASE INVOLVING NATIVE CORONARY ARTERY OF NATIVE HEART WITHOUT ANGINA PECTORIS: ICD-10-CM

## 2019-01-15 DIAGNOSIS — R60.0 BILATERAL LOWER EXTREMITY EDEMA: Primary | ICD-10-CM

## 2019-01-15 PROCEDURE — 99213 OFFICE O/P EST LOW 20 MIN: CPT | Performed by: NURSE PRACTITIONER

## 2019-01-16 ENCOUNTER — HOSPITAL ENCOUNTER (OUTPATIENT)
Dept: MRI IMAGING | Age: 59
Discharge: HOME | End: 2019-01-16
Payer: COMMERCIAL

## 2019-01-16 ENCOUNTER — OFFICE VISIT (OUTPATIENT)
Dept: NEUROLOGY | Age: 59
End: 2019-01-16
Payer: COMMERCIAL

## 2019-01-16 VITALS
WEIGHT: 293 LBS | HEART RATE: 66 BPM | DIASTOLIC BLOOD PRESSURE: 72 MMHG | BODY MASS INDEX: 45.99 KG/M2 | HEIGHT: 67 IN | SYSTOLIC BLOOD PRESSURE: 138 MMHG

## 2019-01-16 DIAGNOSIS — Z00.6 EXAMINATION FOR NORMAL COMPARISON FOR CLINICAL RESEARCH: ICD-10-CM

## 2019-01-16 DIAGNOSIS — G40.909 SEIZURE DISORDER (HCC): Primary | ICD-10-CM

## 2019-01-16 PROCEDURE — 3209999900 MRI COMPARISON OF OUTSIDE FILMS

## 2019-01-16 PROCEDURE — 99213 OFFICE O/P EST LOW 20 MIN: CPT | Performed by: PSYCHIATRY & NEUROLOGY

## 2019-01-21 ENCOUNTER — HOSPITAL ENCOUNTER (INPATIENT)
Age: 59
LOS: 3 days | Discharge: HOME | DRG: 101 | End: 2019-01-24
Attending: PSYCHIATRY & NEUROLOGY | Admitting: PSYCHIATRY & NEUROLOGY
Payer: COMMERCIAL

## 2019-01-21 PROBLEM — R56.9 SEIZURES (HCC): Status: ACTIVE | Noted: 2019-01-21

## 2019-01-21 PROCEDURE — 2060000000 HC ICU INTERMEDIATE R&B

## 2019-01-21 PROCEDURE — 6360000002 HC RX W HCPCS: Performed by: INTERNAL MEDICINE

## 2019-01-21 PROCEDURE — 81003 URINALYSIS AUTO W/O SCOPE: CPT

## 2019-01-21 PROCEDURE — 95951 HC EEG MONITORING VIDEO RECORDING: CPT

## 2019-01-21 PROCEDURE — 99222 1ST HOSP IP/OBS MODERATE 55: CPT | Performed by: PSYCHIATRY & NEUROLOGY

## 2019-01-21 PROCEDURE — 2580000003 HC RX 258: Performed by: INTERNAL MEDICINE

## 2019-01-21 PROCEDURE — 6370000000 HC RX 637 (ALT 250 FOR IP): Performed by: INTERNAL MEDICINE

## 2019-01-21 RX ORDER — PHENYTOIN SODIUM 200 MG/1
200 CAPSULE, EXTENDED RELEASE ORAL 2 TIMES DAILY
Status: DISCONTINUED | OUTPATIENT
Start: 2019-01-21 | End: 2019-01-21

## 2019-01-21 RX ORDER — SODIUM CHLORIDE 0.9 % (FLUSH) 0.9 %
10 SYRINGE (ML) INJECTION PRN
Status: DISCONTINUED | OUTPATIENT
Start: 2019-01-21 | End: 2019-01-24 | Stop reason: HOSPADM

## 2019-01-21 RX ORDER — SODIUM CHLORIDE 0.9 % (FLUSH) 0.9 %
10 SYRINGE (ML) INJECTION EVERY 12 HOURS SCHEDULED
Status: DISCONTINUED | OUTPATIENT
Start: 2019-01-21 | End: 2019-01-24 | Stop reason: HOSPADM

## 2019-01-21 RX ORDER — ONDANSETRON 2 MG/ML
4 INJECTION INTRAMUSCULAR; INTRAVENOUS EVERY 6 HOURS PRN
Status: DISCONTINUED | OUTPATIENT
Start: 2019-01-21 | End: 2019-01-24 | Stop reason: HOSPADM

## 2019-01-21 RX ORDER — ASPIRIN 81 MG/1
81 TABLET, CHEWABLE ORAL DAILY
Status: DISCONTINUED | OUTPATIENT
Start: 2019-01-22 | End: 2019-01-24 | Stop reason: HOSPADM

## 2019-01-21 RX ORDER — FUROSEMIDE 40 MG/1
40 TABLET ORAL DAILY
Status: DISCONTINUED | OUTPATIENT
Start: 2019-01-22 | End: 2019-01-24 | Stop reason: HOSPADM

## 2019-01-21 RX ORDER — LACOSAMIDE 100 MG/1
100 TABLET ORAL 2 TIMES DAILY
Status: DISCONTINUED | OUTPATIENT
Start: 2019-01-21 | End: 2019-01-22

## 2019-01-21 RX ORDER — LEVETIRACETAM 500 MG/1
500 TABLET ORAL 2 TIMES DAILY
Status: DISCONTINUED | OUTPATIENT
Start: 2019-01-21 | End: 2019-01-22

## 2019-01-21 RX ORDER — LORAZEPAM 2 MG/ML
2 INJECTION INTRAMUSCULAR EVERY 10 MIN PRN
Status: DISCONTINUED | OUTPATIENT
Start: 2019-01-21 | End: 2019-01-24 | Stop reason: HOSPADM

## 2019-01-21 RX ADMIN — ENOXAPARIN SODIUM 40 MG: 40 INJECTION SUBCUTANEOUS at 18:13

## 2019-01-21 RX ADMIN — LEVETIRACETAM 500 MG: 500 TABLET, FILM COATED ORAL at 20:54

## 2019-01-21 RX ADMIN — Medication 10 ML: at 20:54

## 2019-01-21 RX ADMIN — EXTENDED PHENYTOIN SODIUM 150 MG: 30 CAPSULE ORAL at 20:54

## 2019-01-21 RX ADMIN — LACOSAMIDE 100 MG: 100 TABLET, FILM COATED ORAL at 20:54

## 2019-01-21 RX ADMIN — METOPROLOL TARTRATE 37.5 MG: 25 TABLET, FILM COATED ORAL at 20:52

## 2019-01-21 ASSESSMENT — PAIN SCALES - GENERAL: PAINLEVEL_OUTOF10: 0

## 2019-01-22 LAB
ABSOLUTE EOS #: 0.3 K/UL (ref 0–0.44)
ABSOLUTE IMMATURE GRANULOCYTE: <0.03 K/UL (ref 0–0.3)
ABSOLUTE LYMPH #: 1.58 K/UL (ref 1.1–3.7)
ABSOLUTE MONO #: 0.99 K/UL (ref 0.1–1.2)
ALBUMIN SERPL-MCNC: 3.5 G/DL (ref 3.5–5.2)
ALBUMIN/GLOBULIN RATIO: 1.1 (ref 1–2.5)
ALP BLD-CCNC: 106 U/L (ref 35–104)
ALT SERPL-CCNC: 32 U/L (ref 5–33)
ANION GAP SERPL CALCULATED.3IONS-SCNC: 13 MMOL/L (ref 9–17)
AST SERPL-CCNC: 26 U/L
BASOPHILS # BLD: 0 % (ref 0–2)
BASOPHILS ABSOLUTE: 0.03 K/UL (ref 0–0.2)
BILIRUB SERPL-MCNC: 0.23 MG/DL (ref 0.3–1.2)
BUN BLDV-MCNC: 13 MG/DL (ref 6–20)
BUN/CREAT BLD: ABNORMAL (ref 9–20)
CALCIUM SERPL-MCNC: 8.9 MG/DL (ref 8.6–10.4)
CHLORIDE BLD-SCNC: 102 MMOL/L (ref 98–107)
CO2: 25 MMOL/L (ref 20–31)
CREAT SERPL-MCNC: 0.81 MG/DL (ref 0.5–0.9)
DIFFERENTIAL TYPE: ABNORMAL
EOSINOPHILS RELATIVE PERCENT: 4 % (ref 1–4)
GFR AFRICAN AMERICAN: >60 ML/MIN
GFR NON-AFRICAN AMERICAN: >60 ML/MIN
GFR SERPL CREATININE-BSD FRML MDRD: ABNORMAL ML/MIN/{1.73_M2}
GFR SERPL CREATININE-BSD FRML MDRD: ABNORMAL ML/MIN/{1.73_M2}
GLUCOSE BLD-MCNC: 113 MG/DL (ref 70–99)
HCT VFR BLD CALC: 38.6 % (ref 36.3–47.1)
HEMOGLOBIN: 12.2 G/DL (ref 11.9–15.1)
IMMATURE GRANULOCYTES: 0 %
LYMPHOCYTES # BLD: 23 % (ref 24–43)
MCH RBC QN AUTO: 31.3 PG (ref 25.2–33.5)
MCHC RBC AUTO-ENTMCNC: 31.6 G/DL (ref 28.4–34.8)
MCV RBC AUTO: 99 FL (ref 82.6–102.9)
MONOCYTES # BLD: 15 % (ref 3–12)
NRBC AUTOMATED: 0 PER 100 WBC
PDW BLD-RTO: 12.4 % (ref 11.8–14.4)
PLATELET # BLD: 210 K/UL (ref 138–453)
PLATELET ESTIMATE: ABNORMAL
PMV BLD AUTO: 10.3 FL (ref 8.1–13.5)
POTASSIUM SERPL-SCNC: 4 MMOL/L (ref 3.7–5.3)
RBC # BLD: 3.9 M/UL (ref 3.95–5.11)
RBC # BLD: ABNORMAL 10*6/UL
SEG NEUTROPHILS: 58 % (ref 36–65)
SEGMENTED NEUTROPHILS ABSOLUTE COUNT: 3.88 K/UL (ref 1.5–8.1)
SODIUM BLD-SCNC: 140 MMOL/L (ref 135–144)
TOTAL PROTEIN: 6.6 G/DL (ref 6.4–8.3)
WBC # BLD: 6.8 K/UL (ref 3.5–11.3)
WBC # BLD: ABNORMAL 10*3/UL

## 2019-01-22 PROCEDURE — 6370000000 HC RX 637 (ALT 250 FOR IP): Performed by: INTERNAL MEDICINE

## 2019-01-22 PROCEDURE — 95951 PR EEG MONITORING/VIDEORECORD: CPT | Performed by: PSYCHIATRY & NEUROLOGY

## 2019-01-22 PROCEDURE — 6360000002 HC RX W HCPCS: Performed by: INTERNAL MEDICINE

## 2019-01-22 PROCEDURE — 2580000003 HC RX 258: Performed by: INTERNAL MEDICINE

## 2019-01-22 PROCEDURE — 99232 SBSQ HOSP IP/OBS MODERATE 35: CPT | Performed by: PSYCHIATRY & NEUROLOGY

## 2019-01-22 PROCEDURE — 6360000002 HC RX W HCPCS: Performed by: PSYCHIATRY & NEUROLOGY

## 2019-01-22 PROCEDURE — G0008 ADMIN INFLUENZA VIRUS VAC: HCPCS | Performed by: PSYCHIATRY & NEUROLOGY

## 2019-01-22 PROCEDURE — 36415 COLL VENOUS BLD VENIPUNCTURE: CPT

## 2019-01-22 PROCEDURE — 2060000000 HC ICU INTERMEDIATE R&B

## 2019-01-22 PROCEDURE — 90686 IIV4 VACC NO PRSV 0.5 ML IM: CPT | Performed by: PSYCHIATRY & NEUROLOGY

## 2019-01-22 PROCEDURE — 95951 HC EEG MONITORING VIDEO RECORDING: CPT

## 2019-01-22 PROCEDURE — 80053 COMPREHEN METABOLIC PANEL: CPT

## 2019-01-22 PROCEDURE — 85025 COMPLETE CBC W/AUTO DIFF WBC: CPT

## 2019-01-22 RX ORDER — LACOSAMIDE 50 MG/1
50 TABLET ORAL 2 TIMES DAILY
Status: DISCONTINUED | OUTPATIENT
Start: 2019-01-22 | End: 2019-01-23

## 2019-01-22 RX ORDER — LEVETIRACETAM 250 MG/1
250 TABLET ORAL 2 TIMES DAILY
Status: DISCONTINUED | OUTPATIENT
Start: 2019-01-22 | End: 2019-01-23

## 2019-01-22 RX ORDER — PHENYTOIN SODIUM 100 MG/1
100 CAPSULE, EXTENDED RELEASE ORAL 2 TIMES DAILY
Status: DISCONTINUED | OUTPATIENT
Start: 2019-01-22 | End: 2019-01-24 | Stop reason: HOSPADM

## 2019-01-22 RX ADMIN — EXTENDED PHENYTOIN SODIUM 100 MG: 100 CAPSULE ORAL at 20:51

## 2019-01-22 RX ADMIN — ENOXAPARIN SODIUM 40 MG: 40 INJECTION SUBCUTANEOUS at 20:51

## 2019-01-22 RX ADMIN — FUROSEMIDE 40 MG: 40 TABLET ORAL at 08:27

## 2019-01-22 RX ADMIN — LEVETIRACETAM 250 MG: 250 TABLET, FILM COATED ORAL at 20:51

## 2019-01-22 RX ADMIN — INFLUENZA A VIRUS A/MICHIGAN/45/2015 X-275 (H1N1) ANTIGEN (FORMALDEHYDE INACTIVATED), INFLUENZA A VIRUS A/SINGAPORE/INFIMH-16-0019/2016 IVR-186 (H3N2) ANTIGEN (FORMALDEHYDE INACTIVATED), INFLUENZA B VIRUS B/PHUKET/3073/2013 ANTIGEN (FORMALDEHYDE INACTIVATED), AND INFLUENZA B VIRUS B/MARYLAND/15/2016 BX-69A ANTIGEN (FORMALDEHYDE INACTIVATED) 0.5 ML: 15; 15; 15; 15 INJECTION, SUSPENSION INTRAMUSCULAR at 16:43

## 2019-01-22 RX ADMIN — EXTENDED PHENYTOIN SODIUM 150 MG: 30 CAPSULE ORAL at 08:26

## 2019-01-22 RX ADMIN — ASPIRIN 81 MG: 81 TABLET, CHEWABLE ORAL at 08:34

## 2019-01-22 RX ADMIN — Medication 10 ML: at 08:28

## 2019-01-22 RX ADMIN — ENOXAPARIN SODIUM 40 MG: 40 INJECTION SUBCUTANEOUS at 08:27

## 2019-01-22 RX ADMIN — LEVETIRACETAM 500 MG: 500 TABLET, FILM COATED ORAL at 08:26

## 2019-01-22 RX ADMIN — METOPROLOL TARTRATE 37.5 MG: 25 TABLET, FILM COATED ORAL at 20:51

## 2019-01-22 RX ADMIN — METOPROLOL TARTRATE 37.5 MG: 25 TABLET, FILM COATED ORAL at 08:27

## 2019-01-22 RX ADMIN — LACOSAMIDE 100 MG: 100 TABLET, FILM COATED ORAL at 08:27

## 2019-01-22 RX ADMIN — LACOSAMIDE 50 MG: 50 TABLET, FILM COATED ORAL at 20:51

## 2019-01-22 RX ADMIN — Medication 10 ML: at 20:51

## 2019-01-22 ASSESSMENT — PAIN SCALES - GENERAL: PAINLEVEL_OUTOF10: 0

## 2019-01-23 LAB
BILIRUBIN URINE: NEGATIVE
COLOR: YELLOW
COMMENT UA: NORMAL
GLUCOSE URINE: NEGATIVE
KETONES, URINE: NEGATIVE
LEUKOCYTE ESTERASE, URINE: NEGATIVE
NITRITE, URINE: NEGATIVE
PH UA: 5.5 (ref 5–8)
PROTEIN UA: NEGATIVE
SPECIFIC GRAVITY UA: 1.02 (ref 1–1.03)
TURBIDITY: CLEAR
URINE HGB: NEGATIVE
UROBILINOGEN, URINE: NORMAL

## 2019-01-23 PROCEDURE — 6370000000 HC RX 637 (ALT 250 FOR IP): Performed by: INTERNAL MEDICINE

## 2019-01-23 PROCEDURE — 6360000002 HC RX W HCPCS: Performed by: INTERNAL MEDICINE

## 2019-01-23 PROCEDURE — 99232 SBSQ HOSP IP/OBS MODERATE 35: CPT | Performed by: PSYCHIATRY & NEUROLOGY

## 2019-01-23 PROCEDURE — 2580000003 HC RX 258: Performed by: INTERNAL MEDICINE

## 2019-01-23 PROCEDURE — 2060000000 HC ICU INTERMEDIATE R&B

## 2019-01-23 RX ADMIN — FUROSEMIDE 40 MG: 40 TABLET ORAL at 08:22

## 2019-01-23 RX ADMIN — METOPROLOL TARTRATE 37.5 MG: 25 TABLET, FILM COATED ORAL at 20:22

## 2019-01-23 RX ADMIN — ENOXAPARIN SODIUM 40 MG: 40 INJECTION SUBCUTANEOUS at 20:23

## 2019-01-23 RX ADMIN — EXTENDED PHENYTOIN SODIUM 100 MG: 100 CAPSULE ORAL at 20:23

## 2019-01-23 RX ADMIN — LEVETIRACETAM 250 MG: 250 TABLET, FILM COATED ORAL at 08:22

## 2019-01-23 RX ADMIN — Medication 10 ML: at 08:23

## 2019-01-23 RX ADMIN — EXTENDED PHENYTOIN SODIUM 100 MG: 100 CAPSULE ORAL at 08:22

## 2019-01-23 RX ADMIN — ENOXAPARIN SODIUM 40 MG: 40 INJECTION SUBCUTANEOUS at 08:22

## 2019-01-23 RX ADMIN — ASPIRIN 81 MG: 81 TABLET, CHEWABLE ORAL at 08:22

## 2019-01-23 RX ADMIN — Medication 10 ML: at 20:23

## 2019-01-23 RX ADMIN — LACOSAMIDE 50 MG: 50 TABLET, FILM COATED ORAL at 08:22

## 2019-01-23 RX ADMIN — METOPROLOL TARTRATE 37.5 MG: 25 TABLET, FILM COATED ORAL at 08:22

## 2019-01-23 ASSESSMENT — PAIN SCALES - GENERAL: PAINLEVEL_OUTOF10: 0

## 2019-01-24 VITALS
RESPIRATION RATE: 17 BRPM | BODY MASS INDEX: 45.99 KG/M2 | HEIGHT: 67 IN | OXYGEN SATURATION: 99 % | TEMPERATURE: 98.4 F | WEIGHT: 293 LBS | DIASTOLIC BLOOD PRESSURE: 81 MMHG | SYSTOLIC BLOOD PRESSURE: 138 MMHG | HEART RATE: 67 BPM

## 2019-01-24 PROCEDURE — 2580000003 HC RX 258: Performed by: INTERNAL MEDICINE

## 2019-01-24 PROCEDURE — 99239 HOSP IP/OBS DSCHRG MGMT >30: CPT | Performed by: PSYCHIATRY & NEUROLOGY

## 2019-01-24 PROCEDURE — 95951 PR EEG MONITORING/VIDEORECORD: CPT | Performed by: PSYCHIATRY & NEUROLOGY

## 2019-01-24 PROCEDURE — 6360000002 HC RX W HCPCS: Performed by: INTERNAL MEDICINE

## 2019-01-24 PROCEDURE — 6370000000 HC RX 637 (ALT 250 FOR IP): Performed by: INTERNAL MEDICINE

## 2019-01-24 PROCEDURE — 95951 HC EEG MONITORING VIDEO RECORDING: CPT

## 2019-01-24 RX ADMIN — ASPIRIN 81 MG: 81 TABLET, CHEWABLE ORAL at 08:44

## 2019-01-24 RX ADMIN — ENOXAPARIN SODIUM 40 MG: 40 INJECTION SUBCUTANEOUS at 08:47

## 2019-01-24 RX ADMIN — EXTENDED PHENYTOIN SODIUM 100 MG: 100 CAPSULE ORAL at 08:44

## 2019-01-24 RX ADMIN — FUROSEMIDE 40 MG: 40 TABLET ORAL at 08:44

## 2019-01-24 RX ADMIN — METOPROLOL TARTRATE 37.5 MG: 25 TABLET, FILM COATED ORAL at 08:44

## 2019-01-24 RX ADMIN — Medication 10 ML: at 09:05

## 2019-01-24 ASSESSMENT — PAIN SCALES - GENERAL
PAINLEVEL_OUTOF10: 0
PAINLEVEL_OUTOF10: 0

## 2019-02-21 ENCOUNTER — TELEPHONE (OUTPATIENT)
Dept: NEUROLOGY | Age: 59
End: 2019-02-21

## 2019-02-21 ENCOUNTER — OFFICE VISIT (OUTPATIENT)
Dept: NEUROLOGY | Age: 59
End: 2019-02-21
Payer: COMMERCIAL

## 2019-02-21 VITALS
SYSTOLIC BLOOD PRESSURE: 141 MMHG | HEART RATE: 73 BPM | BODY MASS INDEX: 45.99 KG/M2 | HEIGHT: 67 IN | WEIGHT: 293 LBS | DIASTOLIC BLOOD PRESSURE: 73 MMHG

## 2019-02-21 DIAGNOSIS — F32.A DEPRESSION, UNSPECIFIED DEPRESSION TYPE: ICD-10-CM

## 2019-02-21 DIAGNOSIS — G40.109 TEMPORAL LOBE EPILEPSY (HCC): ICD-10-CM

## 2019-02-21 DIAGNOSIS — I10 HYPERTENSION, UNSPECIFIED TYPE: Primary | ICD-10-CM

## 2019-02-21 DIAGNOSIS — G40.909 SEIZURE DISORDER (HCC): ICD-10-CM

## 2019-02-21 PROCEDURE — 99215 OFFICE O/P EST HI 40 MIN: CPT | Performed by: PSYCHIATRY & NEUROLOGY

## 2019-02-21 RX ORDER — LACOSAMIDE 200 MG/1
TABLET ORAL
Qty: 60 TABLET | Refills: 3 | Status: SHIPPED | OUTPATIENT
Start: 2019-03-01 | End: 2019-06-24

## 2019-02-27 ENCOUNTER — OFFICE VISIT (OUTPATIENT)
Dept: NEUROLOGY | Age: 59
End: 2019-02-27
Payer: COMMERCIAL

## 2019-02-27 VITALS
BODY MASS INDEX: 45.99 KG/M2 | SYSTOLIC BLOOD PRESSURE: 142 MMHG | DIASTOLIC BLOOD PRESSURE: 88 MMHG | HEIGHT: 67 IN | HEART RATE: 76 BPM | WEIGHT: 293 LBS

## 2019-02-27 DIAGNOSIS — G40.909 SEIZURE DISORDER (HCC): Primary | ICD-10-CM

## 2019-02-27 DIAGNOSIS — G40.319 INTRACTABLE GENERALIZED IDIOPATHIC EPILEPSY WITHOUT STATUS EPILEPTICUS (HCC): ICD-10-CM

## 2019-02-27 PROCEDURE — 99214 OFFICE O/P EST MOD 30 MIN: CPT | Performed by: PSYCHIATRY & NEUROLOGY

## 2019-03-23 DIAGNOSIS — R56.9 PARTIAL SEIZURE (HCC): Primary | ICD-10-CM

## 2019-03-25 RX ORDER — LEVETIRACETAM 750 MG/1
TABLET ORAL
Qty: 60 TABLET | Refills: 6 | Status: SHIPPED | OUTPATIENT
Start: 2019-03-25 | End: 2019-08-19

## 2019-05-17 ENCOUNTER — OFFICE VISIT (OUTPATIENT)
Dept: NEUROLOGY | Age: 59
End: 2019-05-17
Payer: COMMERCIAL

## 2019-05-17 VITALS
HEART RATE: 74 BPM | BODY MASS INDEX: 45.99 KG/M2 | SYSTOLIC BLOOD PRESSURE: 136 MMHG | WEIGHT: 293 LBS | HEIGHT: 67 IN | DIASTOLIC BLOOD PRESSURE: 72 MMHG

## 2019-05-17 DIAGNOSIS — G40.909 SEIZURE DISORDER (HCC): Primary | ICD-10-CM

## 2019-05-17 PROCEDURE — 99213 OFFICE O/P EST LOW 20 MIN: CPT | Performed by: PSYCHIATRY & NEUROLOGY

## 2019-05-17 NOTE — PROGRESS NOTES
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 She reports infrequent, dejavu episodes. .She underwent epilepsy monitoring at [facility], that showed left frontal focal epilepsy. Patient reports she is taking her antiepileptic medications consistently, her last event was in April. She is on Vimpat and Keppra. still reports dejavu episodes. She has applied for disability as her temporary disability ran out. 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 She comes in with family. She is here to go over plan going forward. ROS:  Cardiac: no chest pain. No palpitations. Renal : no flank pain, no hematuria    Skin: no rash    Reviewed labs since last evaluation and discussed with patient. Allergies   Allergen Reactions    Zocor [Simvastatin] Other (See Comments)     Muscle achiness      Atorvastatin     Gabapentin Other (See Comments)     DOESN'T LIKE THE WAY IT MAKES HER FEEL. Current Outpatient Medications:     levETIRAcetam (KEPPRA) 750 MG tablet, TAKE 1 TABLET BY MOUTH TWICE DAILY, Disp: 60 tablet, Rfl: 6    lacosamide (VIMPAT) 200 MG tablet, 200mg BID (titrate up from current 150mg BID, pt has 100mg tablet). , Disp: 60 tablet, Rfl: 3    metoprolol tartrate (LOPRESSOR) 25 MG tablet, Take 1.5 tablets by mouth 2 times daily, Disp: 270 tablet, Rfl: 3    furosemide (LASIX) 40 MG tablet, Take 1 tablet by mouth daily, Disp: 30 tablet, Rfl: 11    aspirin 81 MG tablet, Take 81 mg by mouth daily, Disp: , Rfl:   phenytoin (DILANTIN) 100 MG ER capsule, TAKE ONE CAPSULE BY MOUTH ONCE DAILY (Patient taking differently: 200 mg 2 times daily TAKE ONE CAPSULE BY MOUTH ONCE DAILY), Disp: 30 capsule, Rfl: 6    Multiple Vitamin (MULTIVITAMIN PO), Take  by mouth daily. , Disp: , Rfl:       PE:     Vitals:    05/17/19 1143   BP: 136/72   Site: Right Lower Arm   Position: Sitting   Cuff Size: Medium Adult   Pulse: 74   Weight: (!) 387 lb (175.5 kg)   Height: 5' 7\" (1.702 m)        General Appearance:  awake, alert, oriented, obese. She is tearful at times  Skin:  Skin color, texture, turgor normal. No rashes or lesions. Gen: Language is Intact. Head: no icterus  Neck: There is no carotid bruits. The Neck is supple. Neuro: CN 2-12 grossly intact with no focal deficits. Power 5/5 Throughout symmetric, Reflexes are decreased. Long tracts are intact. Cerebellar exam is Intact. Sensory exam is intact to light touch. Gait is intact. Musculoskeletal:  Has no hand arthritis, no limitation of ROM in any of the four extremities.   Lower extremities + 1 edema      DATA:  Results for orders placed or performed during the hospital encounter of 01/21/19   Urinalysis Reflex to Culture   Result Value Ref Range    Color, UA YELLOW YEL    Turbidity UA CLEAR CLEAR    Glucose, Ur NEGATIVE NEG    Bilirubin Urine NEGATIVE NEG    Ketones, Urine NEGATIVE NEG    Specific Gravity, UA 1.022 1.005 - 1.030    Urine Hgb NEGATIVE NEG    pH, UA 5.5 5.0 - 8.0    Protein, UA NEGATIVE NEG    Urobilinogen, Urine Normal NORM    Nitrite, Urine NEGATIVE NEG    Leukocyte Esterase, Urine NEGATIVE NEG    Urinalysis Comments       Microscopic exam not performed based on chemical results unless requested in   Comprehensive Metabolic Panel w/ Reflex to MG   Result Value Ref Range    Glucose 113 (H) 70 - 99 mg/dL    BUN 13 6 - 20 mg/dL    CREATININE 0.81 0.50 - 0.90 mg/dL    Bun/Cre Ratio NOT REPORTED 9 - 20    Calcium 8.9 8.6 - 10.4 mg/dL    Sodium 140 135 - 144 mmol/L Potassium 4.0 3.7 - 5.3 mmol/L    Chloride 102 98 - 107 mmol/L    CO2 25 20 - 31 mmol/L    Anion Gap 13 9 - 17 mmol/L    Alkaline Phosphatase 106 (H) 35 - 104 U/L    ALT 32 5 - 33 U/L    AST 26 <32 U/L    Total Bilirubin 0.23 (L) 0.3 - 1.2 mg/dL    Total Protein 6.6 6.4 - 8.3 g/dL    Alb 3.5 3.5 - 5.2 g/dL    Albumin/Globulin Ratio 1.1 1.0 - 2.5    GFR Non-African American >60 >60 mL/min    GFR African American >60 >60 mL/min    GFR Comment          GFR Staging NOT REPORTED    CBC auto differential   Result Value Ref Range    WBC 6.8 3.5 - 11.3 k/uL    RBC 3.90 (L) 3.95 - 5.11 m/uL    Hemoglobin 12.2 11.9 - 15.1 g/dL    Hematocrit 38.6 36.3 - 47.1 %    MCV 99.0 82.6 - 102.9 fL    MCH 31.3 25.2 - 33.5 pg    MCHC 31.6 28.4 - 34.8 g/dL    RDW 12.4 11.8 - 14.4 %    Platelets 608 596 - 656 k/uL    MPV 10.3 8.1 - 13.5 fL    NRBC Automated 0.0 0.0 per 100 WBC    Differential Type NOT REPORTED     Seg Neutrophils 58 36 - 65 %    Lymphocytes 23 (L) 24 - 43 %    Monocytes 15 (H) 3 - 12 %    Eosinophils % 4 1 - 4 %    Basophils 0 0 - 2 %    Immature Granulocytes 0 0 %    Segs Absolute 3.88 1.50 - 8.10 k/uL    Absolute Lymph # 1.58 1.10 - 3.70 k/uL    Absolute Mono # 0.99 0.10 - 1.20 k/uL    Absolute Eos # 0.30 0.00 - 0.44 k/uL    Basophils # 0.03 0.00 - 0.20 k/uL    Absolute Immature Granulocyte <0.03 0.00 - 0.30 k/uL    WBC Morphology NOT REPORTED     RBC Morphology NOT REPORTED     Platelet Estimate NOT REPORTED        Dilantin level 15.6  CBC and hepatic panel: normal     Assessment:     Diagnosis Orders   1. Seizure disorder [facility])        she reports no problems since April. She is on her medications and taking them consistently. she has to be event free for 6 months before she may resume driving. She was referred to [facility]psy [facility]. She has not been seen there. 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   She is pending

## 2019-05-17 NOTE — PATIENT INSTRUCTIONS
1. Continue with Vimpat 200 mg twice day. 2. Continue with Keppra 750 mg twice a day. 3. No driving, swimming, operating heavy machinery or compromising heights. Report any new events. 4. Report any seizures or spells  5. Follow up in 4 months. 6. Call if any questions or concerns.

## 2019-06-24 ENCOUNTER — OFFICE VISIT (OUTPATIENT)
Dept: NEUROLOGY | Age: 59
End: 2019-06-24
Payer: COMMERCIAL

## 2019-06-24 VITALS
SYSTOLIC BLOOD PRESSURE: 169 MMHG | WEIGHT: 293 LBS | BODY MASS INDEX: 45.99 KG/M2 | HEART RATE: 76 BPM | DIASTOLIC BLOOD PRESSURE: 90 MMHG | HEIGHT: 67 IN

## 2019-06-24 DIAGNOSIS — F32.A DEPRESSION, UNSPECIFIED DEPRESSION TYPE: ICD-10-CM

## 2019-06-24 DIAGNOSIS — I10 HYPERTENSION, UNSPECIFIED TYPE: ICD-10-CM

## 2019-06-24 DIAGNOSIS — G40.909 SEIZURE DISORDER (HCC): ICD-10-CM

## 2019-06-24 DIAGNOSIS — G40.109 FOCAL EPILEPSY (HCC): Primary | ICD-10-CM

## 2019-06-24 DIAGNOSIS — E66.01 OBESITY, MORBID, BMI 50 OR HIGHER (HCC): ICD-10-CM

## 2019-06-24 PROCEDURE — 99215 OFFICE O/P EST HI 40 MIN: CPT | Performed by: PSYCHIATRY & NEUROLOGY

## 2019-06-24 RX ORDER — LAMOTRIGINE 25 MG/1
TABLET ORAL
Qty: 120 TABLET | Refills: 3 | Status: SHIPPED | OUTPATIENT
Start: 2019-06-24 | End: 2019-08-15 | Stop reason: ALTCHOICE

## 2019-06-24 RX ORDER — LACOSAMIDE 100 MG/1
TABLET ORAL
Qty: 150 TABLET | Refills: 3 | Status: SHIPPED | OUTPATIENT
Start: 2019-06-24 | End: 2019-06-25
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
## 2019-06-25 DIAGNOSIS — R56.9 SEIZURE (HCC): Primary | ICD-10-CM

## 2019-06-25 RX ORDER — LACOSAMIDE 200 MG/1
TABLET ORAL
Qty: 60 TABLET | Refills: 3 | Status: SHIPPED | OUTPATIENT
Start: 2019-06-25 | End: 2019-10-29 | Stop reason: SDUPTHER

## 2019-06-25 RX ORDER — LACOSAMIDE 50 MG/1
TABLET ORAL
Qty: 60 TABLET | Refills: 3 | Status: SHIPPED | OUTPATIENT
Start: 2019-06-25 | End: 2019-09-17 | Stop reason: ALTCHOICE

## 2019-08-15 ENCOUNTER — OFFICE VISIT (OUTPATIENT)
Dept: NEUROLOGY | Age: 59
End: 2019-08-15
Payer: COMMERCIAL

## 2019-08-15 VITALS
WEIGHT: 293 LBS | HEART RATE: 65 BPM | HEIGHT: 68 IN | BODY MASS INDEX: 44.41 KG/M2 | DIASTOLIC BLOOD PRESSURE: 64 MMHG | SYSTOLIC BLOOD PRESSURE: 149 MMHG

## 2019-08-15 DIAGNOSIS — F41.9 ANXIETY: ICD-10-CM

## 2019-08-15 DIAGNOSIS — F32.A DEPRESSION, UNSPECIFIED DEPRESSION TYPE: ICD-10-CM

## 2019-08-15 DIAGNOSIS — Z72.820 SLEEP DEPRIVATION: ICD-10-CM

## 2019-08-15 DIAGNOSIS — I10 POORLY-CONTROLLED HYPERTENSION: ICD-10-CM

## 2019-08-15 DIAGNOSIS — R56.9 SEIZURES (HCC): Primary | ICD-10-CM

## 2019-08-15 DIAGNOSIS — R42 DIZZINESS: ICD-10-CM

## 2019-08-15 DIAGNOSIS — G40.109 FOCAL EPILEPSY (HCC): ICD-10-CM

## 2019-08-15 PROCEDURE — 99215 OFFICE O/P EST HI 40 MIN: CPT | Performed by: PSYCHIATRY & NEUROLOGY

## 2019-08-15 RX ORDER — LAMOTRIGINE 100 MG/1
TABLET ORAL
Qty: 60 TABLET | Refills: 3 | Status: SHIPPED | OUTPATIENT
Start: 2019-08-15 | End: 2019-11-07

## 2019-08-15 NOTE — PROGRESS NOTES
[facility]            [facility], [address]; [place], [address] [facility]    [address] [facility], [address], [place], [address] [facility]            Dept: [phone]          Dept Fax: [phone]             MD Diana Agarwal MD Ahmed B. Miguel Spiegel, MD Dennison Jackson, MD Kerri Connors, MD Robinson Eke, [place] 70 UP NOTE                                          PATIENT NAME: Carline Kim   PATIENT MRN: [id number]  FOLLOW UP TODAY: 8/15/2019     Dear Dr. Luci Wills MD,     I had the pleasure of seeing your patient Carline Kim, who comes for follow up. CHIEF COMPLAINT: Follow-up and Seizures     INITIAL & INTERVAL HISTORY:     Carline Kim is a 63year old RH WF, I saw her on 6/24/2019, pt returns for follow up regarding her seizures.      Pt came with brother in law and sister's 15year old grandson to clinic today.      Since last visit, she had \"good days\" and \"bad days\". Last Thursday, she had dizziness (vertigo), which lasted for hours, she slept for almost a day but no seizures. She had vertigo many years ago. No GTC since last visit (lasst one was 7-8 years ago); but she had 2 episodes of dianne vu episodes, not talk during that period, last one was on 7/31/2019. Still depressed, more tremors on both hands, sometime jerking in hand/arm which frustrated her, usually on the right side. She usually took 1 hour, 1-2 hours in the afternoon, at night time, her brother in [address] [place] [address], her best friend would call her around 9PM to 10PM, sometime even 1AM, or even later than that, she definitely took her morning 8AM pills. She is happy finally she received disability benefit.      AEDs;  Current AEDs:   Keppra 750mg BID  Vmpat 250 mg BID (since feb from 150mg BID, increased to 250mg BID)  Dilantin off

## 2019-08-16 DIAGNOSIS — R56.9 SEIZURES (HCC): ICD-10-CM

## 2019-08-19 RX ORDER — LEVETIRACETAM 500 MG/1
500 TABLET ORAL 2 TIMES DAILY
Qty: 60 TABLET | Refills: 0 | Status: SHIPPED | OUTPATIENT
Start: 2019-08-19 | End: 2019-12-06

## 2019-08-20 DIAGNOSIS — R56.9 SEIZURES (HCC): ICD-10-CM

## 2019-08-31 ENCOUNTER — HOSPITAL ENCOUNTER (EMERGENCY)
Age: 59
Discharge: HOME | End: 2019-08-31
Payer: COMMERCIAL

## 2019-08-31 ENCOUNTER — APPOINTMENT (OUTPATIENT)
Dept: CT IMAGING | Age: 59
End: 2019-08-31
Payer: COMMERCIAL

## 2019-08-31 VITALS
SYSTOLIC BLOOD PRESSURE: 140 MMHG | DIASTOLIC BLOOD PRESSURE: 77 MMHG | HEART RATE: 61 BPM | TEMPERATURE: 99 F | RESPIRATION RATE: 16 BRPM

## 2019-08-31 DIAGNOSIS — N20.0 KIDNEY STONE: Primary | ICD-10-CM

## 2019-08-31 LAB
ALBUMIN SERPL-MCNC: 4.3 G/DL (ref 3.5–5.1)
ALP BLD-CCNC: 98 U/L (ref 38–126)
ALT SERPL-CCNC: 39 U/L (ref 11–66)
ANION GAP SERPL CALCULATED.3IONS-SCNC: 11 MEQ/L (ref 8–16)
AST SERPL-CCNC: 38 U/L (ref 5–40)
BACTERIA: ABNORMAL /HPF
BASOPHILS # BLD: 0.9 %
BASOPHILS ABSOLUTE: 0.1 THOU/MM3 (ref 0–0.1)
BILIRUB SERPL-MCNC: 0.4 MG/DL (ref 0.3–1.2)
BILIRUBIN DIRECT: < 0.2 MG/DL (ref 0–0.3)
BILIRUBIN URINE: NEGATIVE
BLOOD, URINE: ABNORMAL
BUN BLDV-MCNC: 12 MG/DL (ref 7–22)
CALCIUM SERPL-MCNC: 10 MG/DL (ref 8.5–10.5)
CASTS 2: ABNORMAL /LPF
CASTS UA: ABNORMAL /LPF
CHARACTER, URINE: ABNORMAL
CHLORIDE BLD-SCNC: 100 MEQ/L (ref 98–111)
CO2: 30 MEQ/L (ref 23–33)
COLOR: YELLOW
CREAT SERPL-MCNC: 1.2 MG/DL (ref 0.4–1.2)
CRYSTALS, UA: ABNORMAL
EOSINOPHIL # BLD: 3.3 %
EOSINOPHILS ABSOLUTE: 0.3 THOU/MM3 (ref 0–0.4)
EPITHELIAL CELLS, UA: ABNORMAL /HPF
ERYTHROCYTE [DISTWIDTH] IN BLOOD BY AUTOMATED COUNT: 13.1 % (ref 11.5–14.5)
ERYTHROCYTE [DISTWIDTH] IN BLOOD BY AUTOMATED COUNT: 45.1 FL (ref 35–45)
GFR SERPL CREATININE-BSD FRML MDRD: 46 ML/MIN/1.73M2
GLUCOSE BLD-MCNC: 142 MG/DL (ref 70–108)
GLUCOSE URINE: NEGATIVE MG/DL
HCT VFR BLD CALC: 43.3 % (ref 37–47)
HEMOGLOBIN: 13.9 GM/DL (ref 12–16)
IMMATURE GRANS (ABS): 0.02 THOU/MM3 (ref 0–0.07)
IMMATURE GRANULOCYTES: 0 %
KETONES, URINE: NEGATIVE
LEUKOCYTE ESTERASE, URINE: NEGATIVE
LYMPHOCYTES # BLD: 24.6 %
LYMPHOCYTES ABSOLUTE: 1.9 THOU/MM3 (ref 1–4.8)
MCH RBC QN AUTO: 30.8 PG (ref 26–33)
MCHC RBC AUTO-ENTMCNC: 32.1 GM/DL (ref 32.2–35.5)
MCV RBC AUTO: 95.8 FL (ref 81–99)
MISCELLANEOUS 2: ABNORMAL
MONOCYTES # BLD: 13.8 %
MONOCYTES ABSOLUTE: 1.1 THOU/MM3 (ref 0.4–1.3)
NITRITE, URINE: NEGATIVE
NUCLEATED RED BLOOD CELLS: 0 /100 WBC
OSMOLALITY CALCULATION: 283.4 MOSMOL/KG (ref 275–300)
PH UA: 6 (ref 5–9)
PLATELET # BLD: 290 THOU/MM3 (ref 130–400)
PMV BLD AUTO: 10.1 FL (ref 9.4–12.4)
POTASSIUM SERPL-SCNC: 4.3 MEQ/L (ref 3.5–5.2)
PROTEIN UA: ABNORMAL
RBC # BLD: 4.52 MILL/MM3 (ref 4.2–5.4)
RBC URINE: > 200 /HPF
RENAL EPITHELIAL, UA: ABNORMAL
SEG NEUTROPHILS: 57.1 %
SEGMENTED NEUTROPHILS ABSOLUTE COUNT: 4.5 THOU/MM3 (ref 1.8–7.7)
SODIUM BLD-SCNC: 141 MEQ/L (ref 135–145)
SPECIFIC GRAVITY, URINE: 1.02 (ref 1–1.03)
TOTAL PROTEIN: 7.9 G/DL (ref 6.1–8)
UROBILINOGEN, URINE: 0.2 EU/DL (ref 0–1)
WBC # BLD: 7.8 THOU/MM3 (ref 4.8–10.8)
WBC UA: ABNORMAL /HPF
YEAST: ABNORMAL

## 2019-08-31 PROCEDURE — 36415 COLL VENOUS BLD VENIPUNCTURE: CPT

## 2019-08-31 PROCEDURE — 81001 URINALYSIS AUTO W/SCOPE: CPT

## 2019-08-31 PROCEDURE — 96375 TX/PRO/DX INJ NEW DRUG ADDON: CPT

## 2019-08-31 PROCEDURE — 99284 EMERGENCY DEPT VISIT MOD MDM: CPT

## 2019-08-31 PROCEDURE — 2580000003 HC RX 258: Performed by: PHYSICIAN ASSISTANT

## 2019-08-31 PROCEDURE — 96374 THER/PROPH/DIAG INJ IV PUSH: CPT

## 2019-08-31 PROCEDURE — 85025 COMPLETE CBC W/AUTO DIFF WBC: CPT

## 2019-08-31 PROCEDURE — 96361 HYDRATE IV INFUSION ADD-ON: CPT

## 2019-08-31 PROCEDURE — 74176 CT ABD & PELVIS W/O CONTRAST: CPT

## 2019-08-31 PROCEDURE — 80053 COMPREHEN METABOLIC PANEL: CPT

## 2019-08-31 PROCEDURE — 6360000002 HC RX W HCPCS: Performed by: PHYSICIAN ASSISTANT

## 2019-08-31 PROCEDURE — 82248 BILIRUBIN DIRECT: CPT

## 2019-08-31 RX ORDER — ONDANSETRON 2 MG/ML
4 INJECTION INTRAMUSCULAR; INTRAVENOUS ONCE
Status: COMPLETED | OUTPATIENT
Start: 2019-08-31 | End: 2019-08-31

## 2019-08-31 RX ORDER — 0.9 % SODIUM CHLORIDE 0.9 %
1000 INTRAVENOUS SOLUTION INTRAVENOUS ONCE
Status: COMPLETED | OUTPATIENT
Start: 2019-08-31 | End: 2019-08-31

## 2019-08-31 RX ORDER — HYDROCODONE BITARTRATE AND ACETAMINOPHEN 5; 325 MG/1; MG/1
1 TABLET ORAL EVERY 6 HOURS PRN
Qty: 12 TABLET | Refills: 0 | Status: SHIPPED | OUTPATIENT
Start: 2019-08-31 | End: 2019-09-03

## 2019-08-31 RX ORDER — ONDANSETRON 4 MG/1
4 TABLET, ORALLY DISINTEGRATING ORAL EVERY 8 HOURS PRN
Qty: 10 TABLET | Refills: 0 | Status: SHIPPED | OUTPATIENT
Start: 2019-08-31 | End: 2019-09-17 | Stop reason: ALTCHOICE

## 2019-08-31 RX ORDER — KETOROLAC TROMETHAMINE 30 MG/ML
30 INJECTION, SOLUTION INTRAMUSCULAR; INTRAVENOUS ONCE
Status: COMPLETED | OUTPATIENT
Start: 2019-08-31 | End: 2019-08-31

## 2019-08-31 RX ORDER — KETOROLAC TROMETHAMINE 10 MG/1
10 TABLET, FILM COATED ORAL EVERY 6 HOURS PRN
Qty: 15 TABLET | Refills: 0 | Status: SHIPPED | OUTPATIENT
Start: 2019-08-31 | End: 2019-09-17 | Stop reason: ALTCHOICE

## 2019-08-31 RX ADMIN — KETOROLAC TROMETHAMINE 30 MG: 30 INJECTION, SOLUTION INTRAMUSCULAR at 12:39

## 2019-08-31 RX ADMIN — SODIUM CHLORIDE 1000 ML: 9 INJECTION, SOLUTION INTRAVENOUS at 12:39

## 2019-08-31 RX ADMIN — ONDANSETRON 4 MG: 2 INJECTION INTRAMUSCULAR; INTRAVENOUS at 12:40

## 2019-08-31 ASSESSMENT — PAIN DESCRIPTION - PAIN TYPE: TYPE: ACUTE PAIN

## 2019-08-31 ASSESSMENT — ENCOUNTER SYMPTOMS
COUGH: 0
DIARRHEA: 0
SHORTNESS OF BREATH: 0
WHEEZING: 0
ABDOMINAL PAIN: 1
SORE THROAT: 0
EYE DISCHARGE: 0
VOMITING: 0
NAUSEA: 0
BACK PAIN: 0
RHINORRHEA: 0
EYE PAIN: 0

## 2019-08-31 ASSESSMENT — PAIN SCALES - GENERAL
PAINLEVEL_OUTOF10: 10
PAINLEVEL_OUTOF10: 2
PAINLEVEL_OUTOF10: 10

## 2019-08-31 ASSESSMENT — PAIN DESCRIPTION - LOCATION: LOCATION: FLANK

## 2019-08-31 ASSESSMENT — PAIN DESCRIPTION - ORIENTATION: ORIENTATION: LEFT

## 2019-08-31 NOTE — ED PROVIDER NOTES
unknown. She indicated that the status of her brother is unknown. She indicated that the status of her maternal grandmother is unknown. She indicated that the status of her maternal grandfather is unknown.   family history includes Cancer in her mother; Diabetes in her maternal grandfather and maternal grandmother; Heart Disease in her father, maternal grandfather, and maternal grandmother; High Blood Pressure in her brother, father, maternal grandfather, mother, and sister; Migraines in her brother, mother, and sister. SOCIAL HISTORY    reports that she quit smoking about 6 years ago. Her smoking use included cigarettes. She quit after 30.00 years of use. She has never used smokeless tobacco. She reports that she does not drink alcohol or use drugs. PHYSICAL EXAM     INITIAL VITALS:  temperature is 99 °F (37.2 °C). Her blood pressure is 140/77 (abnormal) and her pulse is 61. Her respiration is 16. Physical Exam   Constitutional: She is oriented to person, place, and time. Vital signs are normal. She appears well-developed and well-nourished. Non-toxic appearance. No distress. HENT:   Head: Normocephalic and atraumatic. Right Ear: Hearing normal.   Left Ear: Hearing normal.   Nose: Nose normal. No rhinorrhea. Mouth/Throat: Uvula is midline, oropharynx is clear and moist and mucous membranes are normal.   Eyes: Pupils are equal, round, and reactive to light. Conjunctivae and EOM are normal. No scleral icterus. Neck: No JVD present. No neck rigidity. No tracheal deviation present. Cardiovascular: Normal rate, regular rhythm and normal heart sounds. Pulmonary/Chest: Effort normal and breath sounds normal. No respiratory distress. She has no decreased breath sounds. She has no wheezes. Abdominal: Soft. Normal appearance and bowel sounds are normal. She exhibits no distension and no pulsatile midline mass. There is tenderness in the left lower quadrant. There is CVA tenderness (left).  There is no

## 2019-09-05 ENCOUNTER — TELEPHONE (OUTPATIENT)
Dept: NEUROLOGY | Age: 59
End: 2019-09-05

## 2019-09-17 ENCOUNTER — OFFICE VISIT (OUTPATIENT)
Dept: NEUROLOGY | Age: 59
End: 2019-09-17
Payer: COMMERCIAL

## 2019-09-17 VITALS
BODY MASS INDEX: 44.41 KG/M2 | SYSTOLIC BLOOD PRESSURE: 158 MMHG | WEIGHT: 293 LBS | HEIGHT: 68 IN | HEART RATE: 69 BPM | DIASTOLIC BLOOD PRESSURE: 79 MMHG

## 2019-09-17 DIAGNOSIS — G40.109 FOCAL EPILEPSY (HCC): Primary | ICD-10-CM

## 2019-09-17 DIAGNOSIS — F41.9 ANXIETY: ICD-10-CM

## 2019-09-17 DIAGNOSIS — F32.A DEPRESSION, UNSPECIFIED DEPRESSION TYPE: ICD-10-CM

## 2019-09-17 DIAGNOSIS — I10 HYPERTENSION, UNSPECIFIED TYPE: ICD-10-CM

## 2019-09-17 PROCEDURE — 99215 OFFICE O/P EST HI 40 MIN: CPT | Performed by: PSYCHIATRY & NEUROLOGY
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
## 2019-09-17 NOTE — PATIENT INSTRUCTIONS
1. Continue lamictal 100mg twice a day  2. Decrease vimpat as following  Week 1: 200mg morning, 250mg evening  From week 2: 200mg twice a day  3. Continue keppra at 250mg twice a day, after two week, decrease to 250mg daily at night, then 2 weeks later, every other night 250mg for 2 weeks, then off.   4. Seizure precaution  5. Need good blood pressure control    Return in 4-6 weeks.      Briana Weeks MD, MS

## 2019-09-20 ENCOUNTER — OFFICE VISIT (OUTPATIENT)
Dept: CARDIOLOGY CLINIC | Age: 59
End: 2019-09-20
Payer: COMMERCIAL

## 2019-09-20 VITALS
DIASTOLIC BLOOD PRESSURE: 84 MMHG | WEIGHT: 293 LBS | HEART RATE: 66 BPM | BODY MASS INDEX: 45.99 KG/M2 | HEIGHT: 67 IN | SYSTOLIC BLOOD PRESSURE: 140 MMHG

## 2019-09-20 DIAGNOSIS — I25.10 CORONARY ARTERY DISEASE INVOLVING NATIVE CORONARY ARTERY OF NATIVE HEART WITHOUT ANGINA PECTORIS: Primary | ICD-10-CM

## 2019-09-20 DIAGNOSIS — I10 HYPERTENSION, UNSPECIFIED TYPE: ICD-10-CM

## 2019-09-20 PROCEDURE — 93000 ELECTROCARDIOGRAM COMPLETE: CPT | Performed by: INTERNAL MEDICINE

## 2019-09-20 PROCEDURE — 99214 OFFICE O/P EST MOD 30 MIN: CPT | Performed by: INTERNAL MEDICINE

## 2019-09-20 RX ORDER — HYDROCHLOROTHIAZIDE 25 MG/1
25 TABLET ORAL DAILY
Qty: 30 TABLET | Refills: 3 | Status: SHIPPED | OUTPATIENT
Start: 2019-09-20 | End: 2020-01-13

## 2019-09-20 RX ORDER — CARVEDILOL 12.5 MG/1
12.5 TABLET ORAL 2 TIMES DAILY
Qty: 60 TABLET | Refills: 3 | Status: SHIPPED | OUTPATIENT
Start: 2019-09-20 | End: 2020-01-13

## 2019-09-20 NOTE — PROGRESS NOTES
[address]  [address] [address]  Dept: [phone]  Dept Fax: [phone]  Loc: [phone]    Visit Date: 9/20/2019    Ms. Fidel Cunha is a 63 y.o. female  who presented for:  Chief Complaint   Patient presents with    6 Month Follow-Up    Hyperlipidemia    Palpitations       HPI:   HPI   64 yo F who was seen previously for pre-op [facility] who now presents for evaluation of palpitations. Has seizure disorder. Neurology has started Vimpat and she believes it is causing palpitations. Lasts a couple minutes. Cannot tell sayra. She knows she has had PVCs. She was a prior telemetry nurse. LVEF 55-60%. NSR. Cannot drive. No chest pain, angina, GARRISON, orthopnea, PND, sob at rest, LE edema, or syncope. Takes Lasix for mild LE swelling. Current Outpatient Medications:     levETIRAcetam (KEPPRA) 500 MG tablet, Take 1 tablet by mouth 2 times daily (Patient taking differently: Take 250 mg by mouth 2 times daily ), Disp: 60 tablet, Rfl: 0    lamoTRIgine (LAMICTAL) 100 MG tablet, 50mg AM, 100mg PM for 2wk, then 100mg BID (Patient taking differently: 100 mg 2 times daily ), Disp: 60 tablet, Rfl: 3    lacosamide (VIMPAT) 200 MG tablet, Take one tab BID a day along with Vimpat 50 mg for total 250 mg BID, Disp: 60 tablet, Rfl: 3    metoprolol tartrate (LOPRESSOR) 25 MG tablet, Take 1.5 tablets by mouth 2 times daily, Disp: 270 tablet, Rfl: 3    furosemide (LASIX) 40 MG tablet, Take 1 tablet by mouth daily, Disp: 30 tablet, Rfl: 11    aspirin 81 MG tablet, Take 81 mg by mouth daily, Disp: , Rfl:     Past Medical History  Kai Roman  has a past medical history of Difficult intubation, Kidney stones, Psoriasis, and Seizures ([address] Social History  Kai Roman  reports that she quit smoking about 6 years ago. Her smoking use included cigarettes. She quit after 30.00 years of use.  She has never used smokeless tobacco. She reports that she does not drink alcohol or use drugs. Family History  Gustavo Velasco family history includes Cancer in her mother; Diabetes in her maternal grandfather and maternal grandmother; Heart Disease in her father, maternal grandfather, and maternal grandmother; High Blood Pressure in her brother, father, maternal grandfather, mother, and sister; Migraines in her brother, mother, and sister. There is no family history of bicuspid aortic valve, aneurysms, heart transplant, pacemakers, defibrillators, or sudden cardiac death. Past Surgical History   Past Surgical History:   Procedure Laterality Date    CARDIAC CATHETERIZATION      DILATATION, ESOPHAGUS      DILATION AND CURETTAGE OF UTERUS      KIDNEY STONE SURGERY      LITHOTRIPSY      DE HYSTEROSCOPY,W/ENDO BX N/A 11/9/2018    DILATATION AND CURETTAGE HYSTEROSCOPY performed by Gui Bill MD at [address] [facility]         Review of Systems   Constitutional: Negative for chills and fever  HENT: Negative for congestion, sinus pressure, sneezing and sore throat. Eyes: Negative for pain, discharge, redness and itching. Respiratory: Negative for apnea, cough  Gastrointestinal: Negative for blood in stool, constipation, diarrhea   Endocrine: Negative for cold intolerance, heat intolerance, polydipsia. Genitourinary: Negative for dysuria, enuresis, flank pain and hematuria. Musculoskeletal: Negative for arthralgias, joint swelling and neck pain. Neurological: Negative for numbness and headaches. Psychiatric/Behavioral: Negative for agitation, confusion, decreased concentration and dysphoric mood.      Objective:     BP (!) 150/82   Pulse 66   Ht 5' 7\" (1.702 m)   Wt (!) 368 lb (166.9 kg)   BMI 57.64 kg/m²     Wt Readings from Last 3 Encounters:   09/20/19 (!) 368 lb (166.9 kg)   09/17/19 (!) 370 lb 3.2 oz (167.9 kg)   08/15/19 (!) 371 lb 12.8 oz (168.6 kg)     BP Readings from Last 3 Encounters:   09/20/19 (!) 150/82   09/17/19 (!) 158/79

## 2019-10-29 DIAGNOSIS — R56.9 SEIZURE (HCC): ICD-10-CM

## 2019-10-31 RX ORDER — LACOSAMIDE 200 MG/1
TABLET ORAL
Qty: 60 TABLET | Refills: 0 | Status: SHIPPED | OUTPATIENT
Start: 2019-10-31 | End: 2019-11-07

## 2019-11-07 ENCOUNTER — OFFICE VISIT (OUTPATIENT)
Dept: NEUROLOGY | Age: 59
End: 2019-11-07
Payer: COMMERCIAL

## 2019-11-07 VITALS
WEIGHT: 293 LBS | SYSTOLIC BLOOD PRESSURE: 131 MMHG | HEART RATE: 69 BPM | DIASTOLIC BLOOD PRESSURE: 70 MMHG | BODY MASS INDEX: 44.41 KG/M2 | HEIGHT: 68 IN

## 2019-11-07 DIAGNOSIS — F41.9 ANXIETY: ICD-10-CM

## 2019-11-07 DIAGNOSIS — I15.9 SECONDARY HYPERTENSION, UNSPECIFIED: ICD-10-CM

## 2019-11-07 DIAGNOSIS — F32.A DEPRESSION, UNSPECIFIED DEPRESSION TYPE: ICD-10-CM

## 2019-11-07 DIAGNOSIS — G40.109 TEMPORAL LOBE EPILEPSY (HCC): Primary | ICD-10-CM

## 2019-11-07 DIAGNOSIS — R56.9 SEIZURE (HCC): ICD-10-CM

## 2019-11-07 DIAGNOSIS — R56.9 SEIZURES (HCC): ICD-10-CM

## 2019-11-07 PROCEDURE — 99215 OFFICE O/P EST HI 40 MIN: CPT | Performed by: PSYCHIATRY & NEUROLOGY

## 2019-11-07 RX ORDER — LACOSAMIDE 200 MG/1
TABLET ORAL
Qty: 60 TABLET | Refills: 3 | Status: SHIPPED | OUTPATIENT
Start: 2019-11-07 | End: 2019-11-07

## 2019-11-07 RX ORDER — LAMOTRIGINE 100 MG/1
TABLET ORAL
Qty: 90 TABLET | Refills: 3 | Status: SHIPPED | OUTPATIENT
Start: 2019-11-07 | End: 2020-02-09

## 2019-11-07 RX ORDER — LACOSAMIDE 100 MG/1
TABLET ORAL
Qty: 120 TABLET | Refills: 3 | Status: SHIPPED | OUTPATIENT
Start: 2019-11-07 | End: 2019-11-21 | Stop reason: SDUPTHER

## 2019-11-21 DIAGNOSIS — R56.9 SEIZURE (HCC): ICD-10-CM

## 2019-11-21 RX ORDER — LACOSAMIDE 100 MG/1
TABLET ORAL
Qty: 180 TABLET | Refills: 0 | OUTPATIENT
Start: 2019-11-21 | End: 2020-02-10 | Stop reason: SDUPTHER

## 2019-12-06 ENCOUNTER — OFFICE VISIT (OUTPATIENT)
Dept: NEUROLOGY | Age: 59
End: 2019-12-06

## 2019-12-06 VITALS
HEART RATE: 72 BPM | BODY MASS INDEX: 44.41 KG/M2 | WEIGHT: 293 LBS | SYSTOLIC BLOOD PRESSURE: 142 MMHG | DIASTOLIC BLOOD PRESSURE: 78 MMHG | HEIGHT: 68 IN

## 2019-12-06 DIAGNOSIS — G40.909 SEIZURE DISORDER (HCC): Primary | ICD-10-CM

## 2019-12-06 PROCEDURE — 99213 OFFICE O/P EST LOW 20 MIN: CPT | Performed by: PSYCHIATRY & NEUROLOGY

## 2020-01-09 ENCOUNTER — TELEPHONE (OUTPATIENT)
Dept: NEUROLOGY | Age: 60
End: 2020-01-09

## 2020-01-09 ENCOUNTER — OFFICE VISIT (OUTPATIENT)
Dept: NEUROLOGY | Age: 60
End: 2020-01-09
Payer: COMMERCIAL

## 2020-01-09 VITALS
HEART RATE: 79 BPM | SYSTOLIC BLOOD PRESSURE: 145 MMHG | HEIGHT: 68 IN | WEIGHT: 293 LBS | DIASTOLIC BLOOD PRESSURE: 80 MMHG | BODY MASS INDEX: 44.41 KG/M2

## 2020-01-09 PROCEDURE — 99214 OFFICE O/P EST MOD 30 MIN: CPT | Performed by: PSYCHIATRY & NEUROLOGY

## 2020-01-09 NOTE — TELEPHONE ENCOUNTER
A call was placed to the patient. It went to voice mail. the voice mail was not set up. I could not leave a message.
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
## 2020-01-09 NOTE — PATIENT INSTRUCTIONS
1. Takes lamictal at [address], 8PM; vimpat 10AM, 10PM  2. Keep current dose of lamictal and vimpat  3. Seizure precaution  4.  Check lamictal and vimpat trough level (draw blood before next dose) in one week, according to level, adjust dose, may consider taper off vimpat, increase lamictal    Return in 8-10 weeks     Sophie Vera MD, MS

## 2020-01-13 RX ORDER — HYDROCHLOROTHIAZIDE 25 MG/1
TABLET ORAL
Qty: 30 TABLET | Refills: 3 | Status: SHIPPED | OUTPATIENT
Start: 2020-01-13 | End: 2020-03-19 | Stop reason: SDUPTHER

## 2020-01-13 RX ORDER — CARVEDILOL 12.5 MG/1
TABLET ORAL
Qty: 60 TABLET | Refills: 3 | Status: SHIPPED | OUTPATIENT
Start: 2020-01-13 | End: 2020-03-19 | Stop reason: SDUPTHER

## 2020-02-09 RX ORDER — LAMOTRIGINE 200 MG/1
TABLET ORAL
Qty: 60 TABLET | Refills: 3 | Status: SHIPPED | OUTPATIENT
Start: 2020-02-09 | End: 2020-06-09

## 2020-02-11 RX ORDER — LACOSAMIDE 100 MG/1
TABLET ORAL
Qty: 180 TABLET | Refills: 0 | Status: SHIPPED | OUTPATIENT
Start: 2020-02-11 | End: 2020-05-14 | Stop reason: ALTCHOICE

## 2020-02-13 ENCOUNTER — TELEPHONE (OUTPATIENT)
Dept: NEUROLOGY | Age: 60
End: 2020-02-13

## 2020-02-13 NOTE — TELEPHONE ENCOUNTER
Her lamictal and Vimpat level in normal range. We will change    Lamictal   Week 1: 150mg morning, 200mg evening  From week 2: 200mg twice a day    After 2 weeks from now, taper off vimpat   Week 1: 50mg morning, 100mg evening  Week 2: 50mg morning, 50mg evening  Week 3: 50mg daily  Week 4: 50mg every other day  From week 5: off    Lamictal 200mg BID and Vimpat 100mg BID prescription will be sent. Please let pt pay attention to above change schedule. Thanks.

## 2020-03-02 ENCOUNTER — TELEPHONE (OUTPATIENT)
Dept: NEUROLOGY | Age: 60
End: 2020-03-02

## 2020-03-02 NOTE — TELEPHONE ENCOUNTER
We had received a request from [facility] for a Vimpat prescription. A prescription for a 90 day supply was [facility] 2[facility]11/[facility]. I called Ike Boas to see where she was getting the medication from. She told me to disregard the one we received. She is getting it locally as she is being weaned off the medication. Ike Boas said that she talked to [facility] and told them to cancel her membership.

## 2020-03-10 ENCOUNTER — OFFICE VISIT (OUTPATIENT)
Dept: NEUROLOGY | Age: 60
End: 2020-03-10
Payer: COMMERCIAL

## 2020-03-10 VITALS
HEART RATE: 74 BPM | WEIGHT: 293 LBS | DIASTOLIC BLOOD PRESSURE: 64 MMHG | RESPIRATION RATE: 20 BRPM | BODY MASS INDEX: 44.41 KG/M2 | HEIGHT: 68 IN | SYSTOLIC BLOOD PRESSURE: 129 MMHG

## 2020-03-10 PROCEDURE — 99215 OFFICE O/P EST HI 40 MIN: CPT | Performed by: PSYCHIATRY & NEUROLOGY
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
## 2020-03-20 RX ORDER — CARVEDILOL 12.5 MG/1
TABLET ORAL
Qty: 60 TABLET | Refills: 3 | Status: SHIPPED | OUTPATIENT
Start: 2020-03-20 | End: 2020-05-14 | Stop reason: SDUPTHER

## 2020-03-20 RX ORDER — HYDROCHLOROTHIAZIDE 25 MG/1
TABLET ORAL
Qty: 30 TABLET | Refills: 3 | Status: SHIPPED | OUTPATIENT
Start: 2020-03-20 | End: 2020-05-14 | Stop reason: SDUPTHER

## 2020-05-14 ENCOUNTER — OFFICE VISIT (OUTPATIENT)
Dept: CARDIOLOGY CLINIC | Age: 60
End: 2020-05-14
Payer: COMMERCIAL

## 2020-05-14 VITALS
BODY MASS INDEX: 45.99 KG/M2 | DIASTOLIC BLOOD PRESSURE: 72 MMHG | SYSTOLIC BLOOD PRESSURE: 134 MMHG | HEIGHT: 67 IN | HEART RATE: 64 BPM | WEIGHT: 293 LBS

## 2020-05-14 PROCEDURE — 99212 OFFICE O/P EST SF 10 MIN: CPT | Performed by: INTERNAL MEDICINE

## 2020-05-14 RX ORDER — CARVEDILOL 12.5 MG/1
TABLET ORAL
Qty: 180 TABLET | Refills: 0 | Status: SHIPPED | OUTPATIENT
Start: 2020-05-14

## 2020-05-14 RX ORDER — HYDROCHLOROTHIAZIDE 25 MG/1
TABLET ORAL
Qty: 90 TABLET | Refills: 0 | Status: SHIPPED | OUTPATIENT
Start: 2020-05-14

## 2020-06-09 RX ORDER — LAMOTRIGINE 200 MG/1
200 TABLET ORAL 2 TIMES DAILY
Qty: 60 TABLET | Refills: 3 | Status: SHIPPED | OUTPATIENT
Start: 2020-06-09 | End: 2020-10-13

## 2020-06-26 ENCOUNTER — TELEPHONE (OUTPATIENT)
Dept: NEUROLOGY | Age: 60
End: 2020-06-26

## 2020-06-26 NOTE — TELEPHONE ENCOUNTER
The following message says that she sent one yesterday. I do not see any other message. I sent a message yesterday about the nausea and dry heaves after taking my morning meds this not everyday but when it happens I can hardly manage it. I also have days when I feel fuzzy headed, on these days my balance is worse and I have more problems with words coming out jumbled and being able to make correct sentences. These are coming more frequently. I feel great for 3-4days then this happens.  Not sure what to do

## 2020-06-27 RX ORDER — LAMOTRIGINE 200 MG/1
400 TABLET, EXTENDED RELEASE ORAL NIGHTLY
Qty: 60 TABLET | Refills: 3 | Status: SHIPPED | OUTPATIENT
Start: 2020-06-27 | End: 2020-09-11

## 2020-06-27 NOTE — TELEPHONE ENCOUNTER
If it only occurred in the morning after she took lamictal, we can try to get long acting lamictal (if insurance covers, I placed an order, for a brand name lamictal, long acting), take at night, once a day. I don't know how much it cost, if too expensive, we can try generic one. Thanks.

## 2020-06-30 ENCOUNTER — TELEPHONE (OUTPATIENT)
Dept: NEUROLOGY | Age: 60
End: 2020-06-30

## 2020-07-01 ENCOUNTER — TELEPHONE (OUTPATIENT)
Dept: NEUROLOGY | Age: 60
End: 2020-07-01

## 2020-07-01 NOTE — TELEPHONE ENCOUNTER
We received approval for the Lamictal  mg. I called the pharmacy to let them know it was approved. I spoke with Royden Simmonds, he ran the prescription. Even with the approval Rashmi's co-pay would be $352.02 per month. I called Jamar Corral and gave her this information. She said that she can not afford that. Please advise.

## 2020-07-01 NOTE — TELEPHONE ENCOUNTER
Then I will cancel the long acting one, she can continue immediate release one, may try evening 300mg, morning 100mg to see whether that helpful or continue staying on 200mg BID. If on 200mg BID, only had nausea on certain days, I am not sure that was due to medication. Thanks.

## 2020-07-02 NOTE — TELEPHONE ENCOUNTER
I spoke with Lacie Quiles and gave her this information. She voiced understanding. I ask her to call back with any problems.

## 2020-09-11 ENCOUNTER — OFFICE VISIT (OUTPATIENT)
Dept: NEUROLOGY | Age: 60
End: 2020-09-11
Payer: COMMERCIAL

## 2020-09-11 VITALS
SYSTOLIC BLOOD PRESSURE: 147 MMHG | HEIGHT: 67 IN | BODY MASS INDEX: 45.99 KG/M2 | HEART RATE: 74 BPM | DIASTOLIC BLOOD PRESSURE: 63 MMHG | WEIGHT: 293 LBS

## 2020-09-11 PROCEDURE — 99214 OFFICE O/P EST MOD 30 MIN: CPT | Performed by: PSYCHIATRY & NEUROLOGY

## 2020-09-11 RX ORDER — NYSTATIN 100000 U/G
CREAM TOPICAL
COMMUNITY
Start: 2020-08-08

## 2020-09-11 RX ORDER — CALCIUM CARBONATE 300MG(750)
400 TABLET,CHEWABLE ORAL DAILY
Qty: 30 TABLET | Refills: 3 | Status: SHIPPED | OUTPATIENT
Start: 2020-09-11 | End: 2021-02-04
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
## 2020-09-11 NOTE — PATIENT INSTRUCTIONS
1. Continue lamictal 200mg twice a day  2. Seizure precaution  3.  Start magnesium oxide 400mg daily, hold if loose stool for constipation, blood pressure, and headache    Return as needed    Bob Mckay MD, MS

## 2020-10-02 ENCOUNTER — OFFICE VISIT (OUTPATIENT)
Dept: NEUROLOGY | Age: 60
End: 2020-10-02
Payer: COMMERCIAL

## 2020-10-02 VITALS
DIASTOLIC BLOOD PRESSURE: 80 MMHG | BODY MASS INDEX: 39.08 KG/M2 | HEIGHT: 67 IN | WEIGHT: 249 LBS | SYSTOLIC BLOOD PRESSURE: 132 MMHG | HEART RATE: 68 BPM

## 2020-10-02 PROCEDURE — 99213 OFFICE O/P EST LOW 20 MIN: CPT | Performed by: PSYCHIATRY & NEUROLOGY

## 2020-10-02 NOTE — PROGRESS NOTES
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 She is seen after evaluation and management with Dr Bashir Mccormick, epilepsy specialist in [facility]. She denies new symptoms. She reports no new seizure, or aura. Mood is pretty good. Still has constipation. Sleep is better except weird dreams, she stopped talking to her friend on phone during night. HA is better, only lasts less than 1 hour. She denies any seizure, her symptoms are controlled with current medication combination. She is pleased with how she is doing. Her medications have been changed and are tolerated well. She denies side effects. She is here to go over results of testing and plan going forward. She comes in by herself. ROS:  Respiratory : no cough, no shortness of breath  Cardiac: no chest pain. No palpitations. Renal : no flank pain, no hematuria    Skin: no rash      Allergies   Allergen Reactions    Vimpat [Lacosamide] Other (See Comments)     dizziness    Zocor [Simvastatin] Other (See Comments)     Muscle achiness      Atorvastatin     Gabapentin Other (See Comments)     DOESN'T LIKE THE WAY IT MAKES HER FEEL.        Current Outpatient Medications:     nystatin (MYCOSTATIN) 213335 UNIT/GM cream, Taking prn, Disp: , Rfl:     Magnesium 400 MG TABS, Take 400 mg by mouth daily (Patient taking differently: Take 400 mg by mouth daily PRN), Disp: 30 tablet, Rfl: 3    lamoTRIgine (LAMICTAL) 200 MG tablet, Take 1 tablet by mouth 2 times daily, Disp: 60 tablet, Rfl: 3    carvedilol (COREG) 12.5 MG tablet, TAKE 1 TABLET BY MOUTH TWICE DAILY, Disp: 180 tablet, Rfl: 0    hydroCHLOROthiazide (HYDRODIURIL) 25 MG tablet, TAKE 1 TABLET BY MOUTH ONCE DAILY, Disp: 90 tablet, Rfl: 0    aspirin 81 MG tablet, Take 81 mg by mouth daily, Disp: , Rfl:     levonorgestrel (MIRENA, 52 MG,) IUD 52 mg, Mirena 20 mcg/24 hours (5 yrs) 52 mg intrauterine device  Take 1 device by intrauterine route., Disp: , Rfl:       PE:   Vitals:    10/02/20 1111   BP: 132/80   Site: Left Upper Arm   Position: Sitting   Cuff Size: Large Adult   Pulse: 68   Weight: 249 lb (112.9 kg)   Height: 5' 7\" (1.702 m)     General Appearance:  alert, cooperative and obese  Skin:  Skin color, texture, turgor normal. No rashes or lesions. Gen: NAD, Language is Intact. Skin: no rash, lesion, moist to touch. warm  Head: no rash, no icterus  Neck: There is no carotid bruits. The Neck is supple. There is no neck lymphadenopathy. Neuro: CN 2-12 grossly intact with no focal deficits. Power 5/5 Throughout symmetric, Reflexes are decreased and symmetric. Long tracts are intact. Cerebellar exam is Intact. Sensory exam is intact to light touch. Gait is intact. Musculoskeletal:  Has no hand arthritis, no limitation of ROM in any of the four extremities. Lower extremities no edema  The abdomen is soft,  intact bowel sounds.          DATA:  Results for orders placed or performed during the hospital encounter of 08/31/19   CBC auto differential   Result Value Ref Range    WBC 7.8 4.8 - 10.8 thou/mm3    RBC 4.52 4.20 - 5.40 mill/mm3    Hemoglobin 13.9 12.0 - 16.0 gm/dl    Hematocrit 43.3 37.0 - 47.0 %    MCV 95.8 81.0 - 99.0 fL    MCH 30.8 26.0 - 33.0 pg    MCHC 32.1 (L) 32.2 - 35.5 gm/dl    RDW-CV 13.1 11.5 - 14.5 %    RDW-SD 45.1 (H) 35.0 - 45.0 fL    Platelets 673 078 - 374 thou/mm3    MPV 10.1 9.4 - 12.4 fL    Seg Neutrophils 57.1 %    Lymphocytes 24.6 %    Monocytes 13.8 %    Eosinophils 3.3 %    Basophils 0.9 %    Immature Granulocytes 0 % Segs Absolute 4.5 1.8 - 7.7 thou/mm3    Lymphocytes Absolute 1.9 1.0 - 4.8 thou/mm3    Monocytes Absolute 1.1 0.4 - 1.3 thou/mm3    Eosinophils Absolute 0.3 0.0 - 0.4 thou/mm3    Basophils Absolute 0.1 0.0 - 0.1 thou/mm3    Immature Grans (Abs) 0.02 0.00 - 0.07 thou/mm3    nRBC 0 /100 wbc   Basic Metabolic Panel   Result Value Ref Range    Sodium 141 135 - 145 meq/L    Potassium 4.3 3.5 - 5.2 meq/L    Chloride 100 98 - 111 meq/L    CO2 30 23 - 33 meq/L    Glucose 142 (H) 70 - 108 mg/dL    BUN 12 7 - 22 mg/dL    CREATININE 1.2 0.4 - 1.2 mg/dL    Calcium 10.0 8.5 - 10.5 mg/dL   Hepatic function panel   Result Value Ref Range    Alb 4.3 3.5 - 5.1 g/dL    Total Bilirubin 0.4 0.3 - 1.2 mg/dL    Bilirubin, Direct <0.2 0.0 - 0.3 mg/dL    Alkaline Phosphatase 98 38 - 126 U/L    AST 38 5 - 40 U/L    ALT 39 11 - 66 U/L    Total Protein 7.9 6.1 - 8.0 g/dL   Anion Gap   Result Value Ref Range    Anion Gap 11.0 8.0 - 16.0 meq/L   Glomerular Filtration Rate, Estimated   Result Value Ref Range    Est, Glom Filt Rate 46 (A) ml/min/1.73m2   Osmolality   Result Value Ref Range    Osmolality Calc 283.4 275.0 - 300 mOsmol/kg   Urine with Reflexed Micro   Result Value Ref Range    Glucose, Ur NEGATIVE NEGATIVE mg/dl    Bilirubin Urine NEGATIVE NEGATIVE    Ketones, Urine NEGATIVE NEGATIVE    Specific Gravity, Urine 1.017 1.002 - 1.03    Blood, Urine LARGE (A) NEGATIVE    pH, UA 6.0 5.0 - 9.0    Protein, UA TRACE (A) NEGATIVE    Urobilinogen, Urine 0.2 0.0 - 1.0 eu/dl    Nitrite, Urine NEGATIVE NEGATIVE    Leukocyte Esterase, Urine NEGATIVE NEGATIVE    Color, UA YELLOW STRAW-YELL    Character, Urine CLOUDY (A) CLEAR-SL C    RBC, UA > 200 0-2/hpf /hpf    WBC, UA 0-2 0-4/hpf /hpf    Epithelial Cells, UA 3-5 3-5/hpf /hpf    Bacteria, UA NONE FEW/NONE S /hpf    Casts UA NONE SEEN NONE SEEN /lpf    Crystals, UA NONE SEEN NONE SEEN    Renal Epithelial, UA NONE SEEN NONE SEEN    Yeast, UA NONE SEEN NONE SEEN    CASTS 2 NONE SEEN NONE SEEN /lpf MISCELLANEOUS 2 NONE SEEN                  Assessment:     Diagnosis Orders   1. Focal epilepsy [address] [address] [facility]     2. Headache disorder          The patient is doing well. She is on Lamictal that is tolerated well. she has had no seizures, no aura, her last grand mal was 7 years ago, last dejavu was in Dec, 2019. In 3/2020, she was off Vimpat, currently she is on lamictal 200mg BID. Mood is pretty good. Still has constipation. Sleep is better except weird dreams, she stopped talking to her friend on phone during night. HA is better, only lasts less than 1 hour. BP better but at home still 130s. On two BP medications. Plan:  1. Continue with current dosage of Lamictal.   2. Lamictal level prior to next visit with Dr Bashir Mccormick. 3.  Follow up with us in 6 months . 4. Continue with same restrictions. 5. Report any new events. 6. Call if any questions or concerns. Time spent evaluating patient, counseling, reviewing records was 15 min.     Nils Gordillo MD

## 2020-10-02 NOTE — PATIENT INSTRUCTIONS
1. Continue with current dosage of Lamictal.   2. Lamictal level prior to next visit with Dr Jennifer Gilbert. 3.  Follow up with us in 6 months . 4. Continue with same restrictions. 5. Report any new events. 6. Call if any questions or concerns.

## 2020-10-12 NOTE — TELEPHONE ENCOUNTER
Pharmacy requesting refill of Lamotrigine 200 mg bid.       Medication active on med list yes      Date of last Rx: 6/9/2020  with 3 refills verified on 10/12/2020   verified by ELZBIETA DUNCAN      Date of last appointment 9/11/2020    Next Visit Date:  5/14/2021

## 2020-10-13 RX ORDER — LAMOTRIGINE 200 MG/1
TABLET ORAL
Qty: 60 TABLET | Refills: 0 | Status: SHIPPED | OUTPATIENT
Start: 2020-10-13 | End: 2020-11-04

## 2020-11-04 NOTE — TELEPHONE ENCOUNTER
Pharmacy requesting refill of Lamictal 200 mg..      Medication active on med list yes      Date of last fill: 10/13/2020  with 0 refills verified on 11/4/2020  verified by ANNEL MCKEE      Date of last appointment 9/11/2020    Next Visit Date:  5/14/2021

## 2020-11-05 RX ORDER — LAMOTRIGINE 200 MG/1
TABLET ORAL
Qty: 60 TABLET | Refills: 6 | Status: SHIPPED | OUTPATIENT
Start: 2020-11-05 | End: 2020-11-06 | Stop reason: SDUPTHER

## 2020-11-09 RX ORDER — LAMOTRIGINE 200 MG/1
TABLET ORAL
Qty: 180 TABLET | Refills: 1 | Status: SHIPPED | OUTPATIENT
Start: 2020-11-09 | End: 2021-04-16 | Stop reason: SDUPTHER

## 2020-11-09 NOTE — TELEPHONE ENCOUNTER
Patient requesting a 90 day supply of Lamictal through [facility]. A new Rx had just been given on 11/5/2020 for #60 with 6 refills. Patient is requesting a 90 day supply.        Medication active on med list yes      Date of last fill: 11/5/2020    verified on 11/9/2020    verified by Archie Solis., JOSE      Date of last appointment 9/11/2020    Next Visit Date:  5/14/2021

## 2021-04-09 ENCOUNTER — VIRTUAL VISIT (OUTPATIENT)
Dept: NEUROLOGY | Age: 61
End: 2021-04-09
Payer: COMMERCIAL

## 2021-04-09 DIAGNOSIS — R56.9 SEIZURES (HCC): Primary | ICD-10-CM

## 2021-04-09 DIAGNOSIS — R20.0 NUMBNESS AND TINGLING OF LEFT SIDE OF FACE: ICD-10-CM

## 2021-04-09 DIAGNOSIS — R20.2 NUMBNESS AND TINGLING OF LEFT SIDE OF FACE: ICD-10-CM

## 2021-04-09 PROCEDURE — 99213 OFFICE O/P EST LOW 20 MIN: CPT | Performed by: PSYCHIATRY & NEUROLOGY

## 2021-04-09 NOTE — PROGRESS NOTES
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 She is seen after evaluation and management with Dr Tommy Antonio, epilepsy specialist in [facility]. She denies new symptoms. She reports no new seizure, or aura. Mood is pretty good. Still has constipation. Sleep is better except weird dreams, she stopped talking to her friend on phone during night. HA is better, only lasts less than 1 hour. She denies any seizure, her symptoms are controlled with current medication combination. She is pleased with how she is doing. Her medications have been changed and are tolerated well. She denies side effects. She is here to go over results of testing and plan going forward. She comes in by herself. ROS:  Respiratory : no cough, no shortness of breath  Cardiac: no chest pain. No palpitations. Renal : no flank pain, no hematuria    Skin: no rash      Allergies   Allergen Reactions    Vimpat [Lacosamide] Other (See Comments)     dizziness    Zocor [Simvastatin] Other (See Comments)     Muscle achiness      Atorvastatin     Gabapentin Other (See Comments)     DOESN'T LIKE THE WAY IT MAKES HER FEEL.        Current Outpatient Medications:     magnesium oxide (MAG-OX) 400 (241.3 Mg) MG TABS tablet, Take 1 tablet by mouth once daily, Disp: 30 tablet, Rfl: 5    lamoTRIgine (LAMICTAL) 200 MG tablet, Take one tablet by mouth twice daily, Disp: 180 tablet, Rfl: 1    levonorgestrel (MIRENA, 52 MG,) IUD 52 mg, Mirena 20 mcg/24 hours (5 yrs) 52 mg intrauterine device  Take 1 device by intrauterine route., Disp: , Rfl:     nystatin (MYCOSTATIN) 970718 UNIT/GM cream, Taking prn, Disp: , Rfl:     carvedilol (COREG) 12.5 MG tablet, TAKE 1 TABLET BY MOUTH TWICE DAILY, Disp: 180 tablet, Rfl: 0    hydroCHLOROthiazide (HYDRODIURIL) 25 MG tablet, TAKE 1 TABLET BY MOUTH ONCE DAILY, Disp: 90 tablet, Rfl: 0    aspirin 81 MG tablet, Take 81 mg by mouth daily, Disp: , Rfl:       PE:   There were no vitals filed for this visit. General Appearance:  alert, cooperative and obese  Skin:  Skin color, texture, turgor normal. No rashes or lesions. Gen: NAD, Language is Intact. Skin: no rash, lesion, moist to touch. warm  Head: no rash, no icterus  Neck: There is no carotid bruits. The Neck is supple. There is no neck lymphadenopathy. Neuro: CN 2-12 grossly intact with no focal deficits. Power 5/5 Throughout symmetric, Reflexes are decreased and symmetric. Long tracts are intact. Cerebellar exam is Intact. Sensory exam is intact to light touch. Gait is intact. Musculoskeletal:  Has no hand arthritis, no limitation of ROM in any of the four extremities. Lower extremities no edema  The abdomen is soft,  intact bowel sounds.          DATA:  Results for orders placed or performed during the hospital encounter of 08/31/19   CBC auto differential   Result Value Ref Range    WBC 7.8 4.8 - 10.8 thou/mm3    RBC 4.52 4.20 - 5.40 mill/mm3    Hemoglobin 13.9 12.0 - 16.0 gm/dl    Hematocrit 43.3 37.0 - 47.0 %    MCV 95.8 81.0 - 99.0 fL    MCH 30.8 26.0 - 33.0 pg    MCHC 32.1 (L) 32.2 - 35.5 gm/dl    RDW-CV 13.1 11.5 - 14.5 %    RDW-SD 45.1 (H) 35.0 - 45.0 fL    Platelets 664 388 - 597 thou/mm3    MPV 10.1 9.4 - 12.4 fL    Seg Neutrophils 57.1 %    Lymphocytes 24.6 %    Monocytes 13.8 %    Eosinophils 3.3 %    Basophils 0.9 %    Immature Granulocytes 0 %    Segs Absolute 4.5 1 - 7 thou/mm3    Lymphocytes Absolute 1.9 1.0 - 4.8 thou/mm3    Monocytes Absolute 1.1 0.4 - 1.3 thou/mm3    Eosinophils Absolute 0.3 0.0 - 0.4 thou/mm3    Basophils Absolute 0.1 0.0 - 0.1 thou/mm3    Immature Grans (Abs) 0.02 0.00 - 0.07 thou/mm3    nRBC 0 /100 wbc   Basic Metabolic Panel   Result Value Ref Range    Sodium 141 135 - 145 meq/L    Potassium 4.3 3.5 - 5.2 meq/L    Chloride 100 98 - 111 meq/L    CO2 30 23 - 33 meq/L    Glucose 142 (H) 70 - 108 mg/dL    BUN 12 7 - 22 mg/dL    CREATININE 1.2 0.4 - 1.2 mg/dL    Calcium 10.0 8.5 - 10.5 mg/dL   Hepatic function panel   Result Value Ref Range    Albumin 4.3 3.5 - 5.1 g/dL    Total Bilirubin 0.4 0.3 - 1.2 mg/dL    Bilirubin, Direct <0.2 0.0 - 0.3 mg/dL    Alkaline Phosphatase 98 38 - 126 U/L    AST 38 5 - 40 U/L    ALT 39 11 - 66 U/L    Total Protein 7.9 6.1 - 8.0 g/dL   Anion Gap   Result Value Ref Range    Anion Gap 11.0 8.0 - 16.0 meq/L   Glomerular Filtration Rate, Estimated   Result Value Ref Range    Est, Glom Filt Rate 46 (A) ml/min/1.73m2   Osmolality   Result Value Ref Range    Osmolality Calc 283.4 275.0 - 300.0 mOsmol/kg   Urine with Reflexed Micro   Result Value Ref Range    Glucose, Ur NEGATIVE NEGATIVE mg/dl    Bilirubin Urine NEGATIVE NEGATIVE    Ketones, Urine NEGATIVE NEGATIVE    Specific Gravity, Urine 1.017 1.002 - 1.03    Blood, Urine LARGE (A) NEGATIVE    pH, UA 6.0 5.0 - 9.0    Protein, UA TRACE (A) NEGATIVE    Urobilinogen, Urine 0.2 0.0 - 1.0 eu/dl    Nitrite, Urine NEGATIVE NEGATIVE    Leukocyte Esterase, Urine NEGATIVE NEGATIVE    Color, UA YELLOW STRAW-YELL    Character, Urine CLOUDY (A) CLEAR-SL C    RBC, UA > 200 0-2/hpf /hpf    WBC, UA 0-2 0-4/hpf /hpf    Epithelial Cells, UA 3-5 3-5/hpf /hpf    Bacteria, UA NONE FEW/NONE S /hpf    Casts UA NONE SEEN NONE SEEN /lpf    Crystals, UA NONE SEEN NONE SEEN    Renal Epithelial, UA NONE SEEN NONE SEEN    Yeast, UA NONE SEEN NONE SEEN    CASTS 2 NONE SEEN NONE SEEN /lpf    MISCELLANEOUS 2 NONE SEEN           Lamictal level =14.4   (3/2021)   CBC hepatic panel 3/2021. Assessment:     Diagnosis Orders   1.  Seizures ([facility] 2. Numbness and tingling of left side of face          The patient is doing well with the seizure control, she is compliant. However she is complaining of left face and arm numbness of one week duration. No vision change, no jaw claudication . She is on Lamictal that is tolerated well. she has had no seizures, no aura, her last grand mal was 7 years ago, last dejavu was in Dec, 2019. I reviewed the patient pertinent labs and records in the EHR and from the epilepsy clinic provider. She is compliant, and is tolerating the lamictal 200mg BID. Her most recent Lamictal level was 14.4 in 3/2021. Mood is pretty good. Sleep is better except weird dreams, she stopped talking to her friend on phone during night. HA is better, she is on two BP medications. She is complaining of left face and arm numbness, thumb, burning sensation, no neck pain, for one week duration. No vision changes. After detailed discussion with patient we agreed on the following plan. Plan:  1. Continue with current dosage of Lamictal. Refills given. 2. Lamictal level, CBC and hepatic panel prior to next visit please. 3. MRI brain with and without contrast evaluate for headache, left face and arm numbness, seizure disorder. 4.  Follow up with us in 6 months . 5. Continue with same restrictions. 6. Report any new events. 7. Call if any questions or concerns. Total time 23 min.         Zak Saenz MD

## 2021-04-09 NOTE — PATIENT INSTRUCTIONS
1.  Continue with current dosage of Lamictal. Refills given. 2. Lamictal level, CBC and hepatic panel prior to next visit please. 3. MRI brain with and without contrast.   4.  Follow up with us in 6 months . 5. Continue with same restrictions. 6. Report any new events. 7. Call if any questions or concerns.   8.

## 2021-04-16 DIAGNOSIS — R56.9 SEIZURES (HCC): Primary | ICD-10-CM

## 2021-04-16 RX ORDER — LAMOTRIGINE 200 MG/1
TABLET ORAL
Qty: 180 TABLET | Refills: 1 | Status: SHIPPED | OUTPATIENT
Start: 2021-04-16 | End: 2021-10-18 | Stop reason: SDUPTHER

## 2021-05-19 LAB
CREAT SERPL-MCNC: 1.3 MG/DL (ref 0.6–1.3)
CREATININE CLEARANCE: 42

## 2021-05-20 ENCOUNTER — TELEPHONE (OUTPATIENT)
Dept: NEUROLOGY | Age: 61
End: 2021-05-20

## 2021-05-20 NOTE — TELEPHONE ENCOUNTER
----- Message from Mariza Irizarry MD sent at 5/20/2021 11:06 AM EDT -----  Please let patient know MRI brain shows no concerning findings.    Mariza Irizarry MD MD 11:05 AM

## 2021-10-18 ENCOUNTER — OFFICE VISIT (OUTPATIENT)
Dept: NEUROLOGY | Age: 61
End: 2021-10-18
Payer: COMMERCIAL

## 2021-10-18 VITALS
HEART RATE: 74 BPM | WEIGHT: 293 LBS | BODY MASS INDEX: 45.99 KG/M2 | DIASTOLIC BLOOD PRESSURE: 72 MMHG | OXYGEN SATURATION: 97 % | SYSTOLIC BLOOD PRESSURE: 124 MMHG | HEIGHT: 67 IN

## 2021-10-18 DIAGNOSIS — R56.9 SEIZURES (HCC): Primary | ICD-10-CM

## 2021-10-18 DIAGNOSIS — R20.0 NUMBNESS AND TINGLING OF LEFT SIDE OF FACE: ICD-10-CM

## 2021-10-18 DIAGNOSIS — R20.2 NUMBNESS AND TINGLING OF LEFT SIDE OF FACE: ICD-10-CM

## 2021-10-18 PROCEDURE — 99214 OFFICE O/P EST MOD 30 MIN: CPT | Performed by: NURSE PRACTITIONER

## 2021-10-18 RX ORDER — LAMOTRIGINE 200 MG/1
TABLET ORAL
Qty: 180 TABLET | Refills: 1 | Status: SHIPPED | OUTPATIENT
Start: 2021-10-18 | End: 2022-04-22 | Stop reason: SDUPTHER

## 2021-10-18 RX ORDER — LORATADINE 10 MG/1
10 TABLET ORAL PRN
COMMUNITY

## 2021-10-18 RX ORDER — DIPHENHYDRAMINE HCL 25 MG
25 TABLET ORAL EVERY 6 HOURS PRN
COMMUNITY

## 2021-10-18 NOTE — PATIENT INSTRUCTIONS
1. Continue with current dosage of Lamictal. Refills given. 2.  Lamictal level, CBC and hepatic panel prior to next visit please. 3.  Follow up with us in 6 months . No driving, swimming, operating heavy machinery or compromising heights until event free for 6 months. Report any new events. Call if any questions. Report any new events. Call if any questions or concerns.

## 2021-10-18 NOTE — PROGRESS NOTES
NEUROLOGY OUT PATIENT FOLLOW UP NOTE:  10/18/81367:28 PM    Jerald Acevedo is here for follow up for seizure, numbness and tingling left side of face. ROS:  Respiratory : no cough, no shortness of breath  Cardiac: no chest pain. No palpitations. Renal : no flank pain, no hematuria    Skin: no rash      Allergies   Allergen Reactions    Vimpat [Lacosamide] Other (See Comments)     dizziness    Zocor [Simvastatin] Other (See Comments)     Muscle achiness      Atorvastatin     Gabapentin Other (See Comments)     DOESN'T LIKE THE WAY IT MAKES HER FEEL. Current Outpatient Medications:     loratadine (CLARITIN) 10 MG tablet, Take 10 mg by mouth as needed, Disp: , Rfl:     diphenhydrAMINE (BENADRYL ALLERGY) 25 MG tablet, Take 25 mg by mouth every 6 hours as needed for Itching, Disp: , Rfl:     triamcinolone (KENALOG) 0.1 % ointment, Apply topically as needed Apply topically 2 times daily. , Disp: , Rfl:     magnesium oxide (MAG-OX) 400 (241.3 Mg) MG TABS tablet, Take 1 tablet by mouth once daily, Disp: 30 tablet, Rfl: 5    levonorgestrel (MIRENA, 52 MG,) IUD 52 mg, Mirena 20 mcg/24 hours (5 yrs) 52 mg intrauterine device  Take 1 device by intrauterine route., Disp: , Rfl:     carvedilol (COREG) 12.5 MG tablet, TAKE 1 TABLET BY MOUTH TWICE DAILY, Disp: 180 tablet, Rfl: 0    hydroCHLOROthiazide (HYDRODIURIL) 25 MG tablet, TAKE 1 TABLET BY MOUTH ONCE DAILY, Disp: 90 tablet, Rfl: 0    aspirin 81 MG tablet, Take 81 mg by mouth daily, Disp: , Rfl:     lamoTRIgine (LAMICTAL) 200 MG tablet, Take one tablet by mouth twice daily, Disp: 180 tablet, Rfl: 1    nystatin (MYCOSTATIN) 807113 UNIT/GM cream, Taking prn (Patient not taking: Reported on 10/18/2021), Disp: , Rfl:     I reviewed the past medical history, allergies, medications, social history and family history.        PE:   Vitals:    10/18/21 1313   BP: 124/72   Site: Left Upper Arm   Position: Sitting   Cuff Size: Medium Adult   Pulse: 74   SpO2: 97% Weight: (!) 343 lb (155.6 kg)   Height: 5' 7\" (1.702 m)     General Appearance:  awake, alert, oriented, in no acute distress  Gen: NAD, Language is Intact. Skin: no rash, lesion, dry  to touch. warm  Head: no rash, no icterus  Neck: There is no carotid bruits. The Neck is supple. There is no neck lymphadenopathy. Neuro: CN 2-12 grossly intact with no focal deficits. Power 5/5 Throughout symmetric, Reflexes are  symmetric. Long tracts are intact. Cerebellar exam is Intact. Sensory exam is intact to light touch. Gait is intact. Musculoskeletal:  Has no hand arthritis, no limitation of ROM in any of the four extremities. Lower extremities no edema          DATA:      Results for orders placed or performed in visit on 21   Creatinine   Result Value Ref Range    CREATININE 1.3 0.6 - 1.3 mg/dL    Creatinine Clearance 42             Results for orders placed in visit on 21    MRI BRAIN W WO CONTRAST    Narrative  Ordering Provider: Kaylen Deluna [facility]    Radiology Department  Patient:  [place] Aguirre  [facility]. :  1960   Sex: [place], 89 [facility] [place]  [place]  [place] [address]   Unit #:   [id number]  Acct #:  [de-identified]  Ordering Phys: Kaylen Watkins MD    Exam Date: 21  Accession #:  [id number]  Exam:  MRI   MRI Brain w/wo Contrast  Result: See Report    STUDY:  MRI BRAIN WITH AND WITHOUT CONTRAST    REASON FOR EXAM:   Female, 62years old. BALANCE ISSUES, LEFT SIDE OF  BODY GETS COLD AND GOES NUMB, SPEECH DIFFICULTIES, BEEN ONGOING FOR  SEVERAL MONTHS, NO INJURY OR SURGERY --  -- CREAT 1.3 -- GFR 42    TECHNIQUE:   Standardized multiplanar fat and water weighted pulse  sequences were obtained. IV Gadavist 15 ML was administered for the  contrast portion of the examination.     COMPARISON:   2019  ___________________________________    FINDINGS:  There is mild cerebral atrophy with widening of the extra-axial spaces and  ventricular dilatation. There are a limited number of small white matter  hyperintensities, distributed throughout the deep white matter tracts of  the cerebral hemispheres, consistent with mild chronic white matter  ischemic changes. There is no evidence for recent intracranial ischemia  or other cause of cytotoxic edema on diffusion weighted imaging (DWI). Normal T2* images of the brain without demonstrated susceptibility  artifact. There is no demonstrated hemosiderin stain. Normal bilateral basal ganglia. Normal thalami. There is no extra-axial  fluid accumulation. Normal flow voids within the major intracranial circulation suggesting  patency by spin echo criteria. Normal venous enhancement. There is no  enhancing intra-axial or extra-axial abnormality. Normal sella turcica, pituitary gland, infundibular stalk, optic chiasm  and hypothalamus. Normal tectal plate and pineal gland. Normal midbrain, ora and medulla. Normal cerebellum. Normal basal  cisterns. Normal bilateral temporal bones. Normal bilateral internal  auditory canals. No demonstrated orbital abnormality, within the constraints of a routine  brain study. Normal visualized paranasal sinuses. Normal calvarium and  skull base. Normal visualized soft tissue structures. Normal visualized  upper cervical spine.  ___________________________________    IMPRESSION:  Involutional changes of the brain, as described above. Electronically Signed:  Darryn Lindsey MD  2021/05/19 at 16:58 EDT  Tel [phone], Service support  [phone], Fax [phone]          cc: Malik Dunaway MD; Diogo Ruggiero MD      Dictated by:  Myla Grover MD on 05/19/21 1658  Technologist:   RT(R)() Christofer Castaneda  Transcribed by:  Myla Grover MD on 05/19/21 1658    Report Signed by:  Randall Nunn on 05/19/21 1658. Assessment:     Diagnosis Orders   1.  Seizures (HCC)  lamoTRIgine (LAMICTAL) 200 MG tablet Lamotrigine Level    CBC With Auto Differential    Hepatic Function Panel   2. Numbness and tingling of left side of face          She reports she has had 2 events since last evaluation , one in June, and the other in September. Typical event is dianne vu sensation. She did undergo MRI brain 5/19/21 that showed no concerning findings. She is on Lamictal that is tolerated well. She did not do lab work as ordered at last visit, checking cbc, HFP and lamcital level, as she forgot to do so. She denies any more episodes of left face numbness. Her sleep is good, she is waking up feeling well rested. After detailed discussion with patient we agreed on the following plan. Plan:  1. Continue with current dosage of Lamictal. Refills given. 2.  Lamictal level, CBC and hepatic panel prior to next visit please. 3.  Follow up with us in 6 months . No driving, swimming, operating heavy machinery or compromising heights until event free for 6 months. Report any new events. Call if any questions. Report any new events. Call if any questions or concerns.      Total time 30 min    ASHLEY Perkins - CNP

## 2022-04-22 ENCOUNTER — OFFICE VISIT (OUTPATIENT)
Dept: NEUROLOGY | Age: 62
End: 2022-04-22
Payer: COMMERCIAL

## 2022-04-22 VITALS
DIASTOLIC BLOOD PRESSURE: 78 MMHG | SYSTOLIC BLOOD PRESSURE: 138 MMHG | BODY MASS INDEX: 45.99 KG/M2 | OXYGEN SATURATION: 96 % | WEIGHT: 293 LBS | HEART RATE: 77 BPM | HEIGHT: 67 IN

## 2022-04-22 DIAGNOSIS — R56.9 SEIZURES (HCC): Primary | ICD-10-CM

## 2022-04-22 DIAGNOSIS — R20.2 NUMBNESS AND TINGLING OF LEFT SIDE OF FACE: ICD-10-CM

## 2022-04-22 DIAGNOSIS — R20.0 NUMBNESS AND TINGLING OF LEFT SIDE OF FACE: ICD-10-CM

## 2022-04-22 PROCEDURE — 99213 OFFICE O/P EST LOW 20 MIN: CPT | Performed by: PSYCHIATRY & NEUROLOGY

## 2022-04-22 RX ORDER — METFORMIN HYDROCHLORIDE 500 MG/1
TABLET, EXTENDED RELEASE ORAL
COMMUNITY
Start: 2022-02-21

## 2022-04-22 RX ORDER — LAMOTRIGINE 200 MG/1
TABLET ORAL
Qty: 180 TABLET | Refills: 1 | Status: SHIPPED | OUTPATIENT
Start: 2022-04-22 | End: 2022-10-21 | Stop reason: SDUPTHER

## 2022-04-22 NOTE — PATIENT INSTRUCTIONS
1. Continue with current dosage of Lamictal. Refills given. 2.  Lamictal level, CBC and hepatic 8/2022.    3.  Follow up with us in 6 months . No driving, swimming, operating heavy machinery or compromising heights until event free for 6 months. Report any new events. Call if any questions. Report any new events. Call if any questions or concerns.

## 2022-04-22 NOTE — PROGRESS NOTES
NEUROLOGY OUT PATIENT FOLLOW UP NOTE:  4/22/20221:17 PM    Leandro Mcardle is here for follow up for seizure, numbness and tingling left side of face. Allergies   Allergen Reactions    Vimpat [Lacosamide] Other (See Comments)     dizziness    Zocor [Simvastatin] Other (See Comments)     Muscle achiness      Atorvastatin     Gabapentin Other (See Comments)     DOESN'T LIKE THE WAY IT MAKES HER FEEL. Current Outpatient Medications:     metFORMIN (GLUCOPHAGE-XR) 500 MG extended release tablet, TAKE 1 TABLET BY MOUTH ONCE DAILY WITH EVENING MEAL, Disp: , Rfl:     loratadine (CLARITIN) 10 MG tablet, Take 10 mg by mouth as needed, Disp: , Rfl:     diphenhydrAMINE (BENADRYL ALLERGY) 25 MG tablet, Take 25 mg by mouth every 6 hours as needed for Itching, Disp: , Rfl:     magnesium oxide (MAG-OX) 400 (241.3 Mg) MG TABS tablet, Take 1 tablet by mouth once daily, Disp: 30 tablet, Rfl: 5    levonorgestrel (MIRENA, 52 MG,) IUD 52 mg, Mirena 20 mcg/24 hours (5 yrs) 52 mg intrauterine device  Take 1 device by intrauterine route., Disp: , Rfl:     carvedilol (COREG) 12.5 MG tablet, TAKE 1 TABLET BY MOUTH TWICE DAILY, Disp: 180 tablet, Rfl: 0    hydroCHLOROthiazide (HYDRODIURIL) 25 MG tablet, TAKE 1 TABLET BY MOUTH ONCE DAILY, Disp: 90 tablet, Rfl: 0    aspirin 81 MG tablet, Take 81 mg by mouth daily, Disp: , Rfl:     triamcinolone (KENALOG) 0.1 % ointment, Apply topically as needed Apply topically 2 times daily. (Patient not taking: Reported on 4/22/2022), Disp: , Rfl:     lamoTRIgine (LAMICTAL) 200 MG tablet, Take one tablet by mouth twice daily, Disp: 180 tablet, Rfl: 1    nystatin (MYCOSTATIN) 365330 UNIT/GM cream, Taking prn (Patient not taking: Reported on 10/18/2021), Disp: , Rfl:     I reviewed the past medical history, allergies, medications, social history and family history.        PE:   Vitals:    04/22/22 1250 04/22/22 1301   BP: (!) 150/84 138/78   Site: Left Upper Arm Left Upper Arm Position: Sitting Sitting   Cuff Size: Large Adult Large Adult   Pulse: 77    SpO2: 96%    Weight: (!) 343 lb (155.6 kg)    Height: 5' 7\" (1.702 m)      General Appearance:  awake, alert, oriented, in no acute distress  Gen: NAD, Language is Intact. Skin: no rash, lesion, dry  to touch. warm  Head: no rash, no icterus  Neck: There is no carotid bruits. The Neck is supple. There is no neck lymphadenopathy. Neuro: CN 2-12 grossly intact with no focal deficits. Power 5/5 Throughout symmetric, Reflexes are  symmetric. Long tracts are intact. Cerebellar exam is Intact. Sensory exam is intact to light touch. Gait is limping left knee. Musculoskeletal:  Has no hand arthritis, no limitation of ROM in any of the four extremities. Lower extremities +2 edema          DATA:      Results for orders placed or performed in visit on 21   Creatinine   Result Value Ref Range    CREATININE 1.3 0.6 - 1.3 mg/dL    Creatinine Clearance 42             Results for orders placed in visit on 21    MRI BRAIN W WO CONTRAST    Narrative  Ordering Provider: Moira Deluna [id number]    Radiology Department  Patient:  Pia Burden  [facility]. :  1960   Sex: [place], [address] [facility]  Location:  [place] [address]   Unit #:   [id number]  [facility] #:  [de-identified]  Ordering Phys: Moira Gilmore MD    Exam Date: 21  Accession #:  [id number]  Exam:  MRI   MRI Brain w/wo Contrast  Result: See Report    STUDY:  MRI BRAIN WITH AND WITHOUT CONTRAST    REASON FOR EXAM:   Female, 62years old. BALANCE ISSUES, LEFT SIDE OF  BODY GETS COLD AND GOES NUMB, SPEECH DIFFICULTIES, BEEN ONGOING FOR  SEVERAL MONTHS, NO INJURY OR SURGERY --  -- CREAT 1.3 -- GFR 42    TECHNIQUE:   Standardized multiplanar fat and water weighted pulse  sequences were obtained. IV Gadavist 15 ML was administered for the  contrast portion of the examination.     COMPARISON: 01/08/2019  ___________________________________    FINDINGS:  There is mild cerebral atrophy with widening of the extra-axial spaces and  ventricular dilatation. There are a limited number of small white matter  hyperintensities, distributed throughout the deep white matter tracts of  the cerebral hemispheres, consistent with mild chronic white matter  ischemic changes. There is no evidence for recent intracranial ischemia  or other cause of cytotoxic edema on diffusion weighted imaging (DWI). Normal T2* images of the brain without demonstrated susceptibility  artifact. There is no demonstrated hemosiderin stain. Normal bilateral basal ganglia. Normal thalami. There is no extra-axial  fluid accumulation. Normal flow voids within the major intracranial circulation suggesting  patency by spin echo criteria. Normal venous enhancement. There is no  enhancing intra-axial or extra-axial abnormality. Normal sella turcica, pituitary gland, infundibular stalk, optic chiasm  and hypothalamus. Normal tectal plate and pineal gland. Normal midbrain, ora and medulla. Normal cerebellum. Normal basal  cisterns. Normal bilateral temporal bones. Normal bilateral internal  auditory canals. No demonstrated orbital abnormality, within the constraints of a routine  brain study. Normal visualized paranasal sinuses. Normal calvarium and  skull base. Normal visualized soft tissue structures. Normal visualized  upper cervical spine.  ___________________________________    IMPRESSION:  Involutional changes of the brain, as described above. Electronically Signed:  Mariaelena Fagan MD  2021/05/19 at 16:58 EDT  Tel [phone], Service support  [phone], Fax [phone]          cc:  Ishaan Drummond MD; Adam Mishra MD      Dictated by:  Bushra Clark MD on 05/19/21 0910  Technologist:   (R)(ROLF Mujica  Transcribed by:  Bushra Clark MD on 05/19/21 2688    Report Signed by: Andreea Navarro MD,Fredo LESTER on 05/19/21 2137. Assessment:     Diagnosis Orders   1. Seizures ([address]     2. Numbness and tingling of left side of face         Follow up for seizure. She denies any seizure. she just had a skin cancer removal left side of the nose bridge. Lamictal level 17.1 in 12/2021. She is compliant. Denies side effects to the medication. Had an episode of confusion, in 12/2021. She has been doing well, aside from some [employer]. Exam is non focal. Typical event is dianne vu sensation. She did undergo MRI brain 5/19/21 that showed no concerning findings. She is on Lamictal that is tolerated well. After detailed discussion with patient we agreed on the following plan. Plan:  1. Continue with current dosage of Lamictal. Refills given. 2.  Lamictal level, CBC and hepatic 8/2022.    3.  Follow up with us in 6 months . No driving, swimming, operating heavy machinery or compromising heights until event free for 6 months. Report any new events. Call if any questions. Report any new events. Call if any questions or concerns.      Total time 23 min    Madison Toro MD

## 2022-10-21 ENCOUNTER — OFFICE VISIT (OUTPATIENT)
Dept: NEUROLOGY | Age: 62
End: 2022-10-21
Payer: COMMERCIAL

## 2022-10-21 VITALS
SYSTOLIC BLOOD PRESSURE: 124 MMHG | DIASTOLIC BLOOD PRESSURE: 88 MMHG | HEIGHT: 67 IN | HEART RATE: 61 BPM | BODY MASS INDEX: 45.99 KG/M2 | WEIGHT: 293 LBS | OXYGEN SATURATION: 98 %

## 2022-10-21 DIAGNOSIS — R56.9 SEIZURES (HCC): Primary | ICD-10-CM

## 2022-10-21 PROCEDURE — 99213 OFFICE O/P EST LOW 20 MIN: CPT | Performed by: NURSE PRACTITIONER

## 2022-10-21 RX ORDER — LAMOTRIGINE 200 MG/1
TABLET ORAL
Qty: 180 TABLET | Refills: 1 | Status: SHIPPED | OUTPATIENT
Start: 2022-10-21 | End: 2023-04-21

## 2022-10-21 NOTE — PROGRESS NOTES
NEUROLOGY OUT PATIENT FOLLOW UP NOTE:  10/21/15801:03 PM    Ivanna Agosto is here for follow up for seizure. ROS:  Respiratory : no cough, no shortness of breath  Cardiac: no chest pain. No palpitations. Renal : no flank pain, no hematuria    Skin: no rash      Allergies   Allergen Reactions    Vimpat [Lacosamide] Other (See Comments)     dizziness    Zocor [Simvastatin] Other (See Comments)     Muscle achiness      Atorvastatin     Gabapentin Other (See Comments)     DOESN'T LIKE THE WAY IT MAKES HER FEEL. Current Outpatient Medications:     metFORMIN (GLUCOPHAGE-XR) 500 MG extended release tablet, TAKE 1 TABLET BY MOUTH ONCE DAILY WITH EVENING MEAL, Disp: , Rfl:     lamoTRIgine (LAMICTAL) 200 MG tablet, Take one tablet by mouth twice daily, Disp: 180 tablet, Rfl: 1    loratadine (CLARITIN) 10 MG tablet, Take 10 mg by mouth as needed, Disp: , Rfl:     diphenhydrAMINE (BENADRYL) 25 MG tablet, Take 25 mg by mouth every 6 hours as needed for Itching, Disp: , Rfl:     triamcinolone (KENALOG) 0.1 % ointment, Apply topically as needed Apply topically 2 times daily. , Disp: , Rfl:     magnesium oxide (MAG-OX) 400 (241.3 Mg) MG TABS tablet, Take 1 tablet by mouth once daily, Disp: 30 tablet, Rfl: 5    levonorgestrel (MIRENA, 52 MG,) IUD 52 mg, Mirena 20 mcg/24 hours (5 yrs) 52 mg intrauterine device  Take 1 device by intrauterine route., Disp: , Rfl:     nystatin (MYCOSTATIN) 132699 UNIT/GM cream, Taking prn, Disp: , Rfl:     carvedilol (COREG) 12.5 MG tablet, TAKE 1 TABLET BY MOUTH TWICE DAILY, Disp: 180 tablet, Rfl: 0    hydroCHLOROthiazide (HYDRODIURIL) 25 MG tablet, TAKE 1 TABLET BY MOUTH ONCE DAILY, Disp: 90 tablet, Rfl: 0    aspirin 81 MG tablet, Take 81 mg by mouth daily, Disp: , Rfl:     I reviewed the past medical history, allergies, medications, social history and family history.        PE:   Vitals:    10/21/22 1256   BP: 124/88   Site: Left Lower Arm   Position: Sitting   Cuff Size: Medium Adult Pulse: 61   SpO2: 98%   Weight: (!) 338 lb (153.3 kg)   Height: 5' 7\" (1.702 m)        General Appearance:  awake, alert, oriented, in no acute distress  Gen: NAD, Language is Intact. Skin: no rash, lesion, dry  to touch. warm  Head: no rash, no icterus  Neck: There is no carotid bruits. The Neck is supple. There is no neck lymphadenopathy. Neuro: CN 2-12 grossly intact with no focal deficits. Power 5/5 Throughout symmetric, Reflexes are  symmetric. Long tracts are intact. Cerebellar exam is Intact. Sensory exam is intact to light touch. Gait is limping left knee. Musculoskeletal:  Has no hand arthritis, no limitation of ROM in any of the four extremities. Lower extremities +2 edema          DATA:         Results for orders placed or performed in visit on 21   Creatinine   Result Value Ref Range    Creatinine 1.3 0.6 - 1.3 mg/dL    Creatinine Clearance 42                Results for orders placed in visit on 21    MRI BRAIN W WO CONTRAST    Narrative  Ordering Provider: Dillon Deluna 1153    Radiology Department  Patient:  Stoney Correa 83. :  1960   [place]: [place], 89 [facility] [place] [address]   Unit #:   [id number]  [facility]  [facility]  Ordering Phys: Dillon Mari MD    Exam Date: 21  Accession #:  [id number]  Exam:  MRI   MRI Brain w/wo Contrast  Result: See Report    STUDY:  MRI BRAIN WITH AND WITHOUT CONTRAST    REASON FOR EXAM:   Female, 62years old. BALANCE ISSUES, LEFT SIDE OF  BODY GETS COLD AND GOES NUMB, SPEECH DIFFICULTIES, BEEN ONGOING FOR  SEVERAL MONTHS, NO INJURY OR SURGERY --  -- CREAT 1.3 -- GFR 42    TECHNIQUE:   Standardized multiplanar fat and water weighted pulse  sequences were obtained. IV Gadavist 15 ML was administered for the  contrast portion of the examination.     COMPARISON:   2019  ___________________________________    FINDINGS:  There is mild cerebral atrophy with widening of the extra-axial spaces and  ventricular dilatation. There are a limited number of small white matter  hyperintensities, distributed throughout the deep white matter tracts of  the cerebral hemispheres, consistent with mild chronic white matter  ischemic changes. There is no evidence for recent intracranial ischemia  or other cause of cytotoxic edema on diffusion weighted imaging (DWI). Normal T2* images of the brain without demonstrated susceptibility  artifact. There is no demonstrated hemosiderin stain. Normal bilateral basal ganglia. Normal thalami. There is no extra-axial  fluid accumulation. Normal flow voids within the major intracranial circulation suggesting  patency by spin echo criteria. Normal venous enhancement. There is no  enhancing intra-axial or extra-axial abnormality. Normal sella turcica, pituitary gland, infundibular stalk, optic chiasm  and hypothalamus. Normal tectal plate and pineal gland. Normal midbrain, ora and medulla. Normal cerebellum. Normal basal  cisterns. Normal bilateral temporal bones. Normal bilateral internal  auditory canals. No demonstrated orbital abnormality, within the constraints of a routine  brain study. Normal visualized paranasal sinuses. Normal calvarium and  skull base. Normal visualized soft tissue structures. Normal visualized  upper cervical spine.  ___________________________________    IMPRESSION:  Involutional changes of the brain, as described above. Electronically Signed:  Kami Santana MD  2021/05/19 at 16:58 EDT  Tel [phone], Service support  [phone], Fax [phone]          cc: Rosalino Morales MD; Xena Lincoln MD      Dictated by:  Gregory Mayberry MD on 05/19/21 1658  Technologist:   RT(R)(ROLF Wang  Transcribed by:  Gregory Mayberry MD on 05/19/21 1658    Report Signed by:  Loree Barfield on 05/19/21 1658    No results found for this or any previous visit.     No results found for this or any previous visit. Assessment:     Diagnosis Orders   1. Seizures ([address]             Follow up for seizure. She denies any seizure. She is on Lamictal and is tolerating the medication well. Most recent Lamictal level done 10/7/22=16.8. She has been doing well, aside from some [employer]. Exam is non focal. Typical event is dianne vu sensation. After detailed discussion with patient we agreed on the following plan. Plan:  Continue with current dosage of Lamictal. Refills given. Lamictal level, CBC and hepatic 4/2023    Vitamin B12, folate  Vitamin B6 level  TSH with reflex T4   Follow up with us in 6 months . No driving, swimming, operating heavy machinery or compromising heights until event free for 6 months. Report any new events. Call if any questions. Report any new events.    Call if any questions or concerns       Total time 22 min    Oliverio Fonseca, ASHLEY - CNP

## 2022-11-29 ENCOUNTER — TELEPHONE (OUTPATIENT)
Dept: NEUROLOGY | Age: 62
End: 2022-11-29

## 2022-11-29 NOTE — TELEPHONE ENCOUNTER
----- Message from ASHLEY Pederson CNP sent at 11/28/2022  1:37 PM EST -----  Please let patient know her vitamin b12 level is marginally low=336. She needs to start on vitamin B12 sublingual supplements, at least 3000 mcg daily, over the counter.   Please have her call the office in 2-3 months to obtain repeat vitamin b12 level  Clyde Capone CNP

## 2023-02-25 DIAGNOSIS — R56.9 SEIZURES (HCC): ICD-10-CM

## 2023-02-27 RX ORDER — LAMOTRIGINE 200 MG/1
TABLET ORAL
Qty: 180 TABLET | Refills: 1 | Status: SHIPPED | OUTPATIENT
Start: 2023-02-27

## 2023-04-21 ENCOUNTER — OFFICE VISIT (OUTPATIENT)
Dept: NEUROLOGY | Age: 63
End: 2023-04-21
Payer: MEDICARE

## 2023-04-21 VITALS
SYSTOLIC BLOOD PRESSURE: 158 MMHG | HEART RATE: 73 BPM | DIASTOLIC BLOOD PRESSURE: 86 MMHG | WEIGHT: 293 LBS | BODY MASS INDEX: 45.99 KG/M2 | OXYGEN SATURATION: 98 % | HEIGHT: 67 IN

## 2023-04-21 DIAGNOSIS — R56.9 SEIZURES (HCC): Primary | ICD-10-CM

## 2023-04-21 PROCEDURE — 3017F COLORECTAL CA SCREEN DOC REV: CPT | Performed by: PSYCHIATRY & NEUROLOGY

## 2023-04-21 PROCEDURE — 1036F TOBACCO NON-USER: CPT | Performed by: PSYCHIATRY & NEUROLOGY

## 2023-04-21 PROCEDURE — G8427 DOCREV CUR MEDS BY ELIG CLIN: HCPCS | Performed by: PSYCHIATRY & NEUROLOGY

## 2023-04-21 PROCEDURE — G8417 CALC BMI ABV UP PARAM F/U: HCPCS | Performed by: PSYCHIATRY & NEUROLOGY

## 2023-04-21 PROCEDURE — 99214 OFFICE O/P EST MOD 30 MIN: CPT | Performed by: PSYCHIATRY & NEUROLOGY

## 2023-04-21 RX ORDER — LAMOTRIGINE 200 MG/1
TABLET ORAL
Qty: 180 TABLET | Refills: 1 | Status: SHIPPED | OUTPATIENT
Start: 2023-04-21

## 2023-04-21 RX ORDER — BIOTIN 10 MG
TABLET ORAL
COMMUNITY

## 2023-05-10 DIAGNOSIS — R56.9 SEIZURES (HCC): Primary | ICD-10-CM

## 2023-05-10 RX ORDER — LAMOTRIGINE 200 MG/1
TABLET ORAL
Qty: 180 TABLET | Refills: 1 | Status: SHIPPED | OUTPATIENT
Start: 2023-05-10

## 2023-10-20 ENCOUNTER — OFFICE VISIT (OUTPATIENT)
Dept: NEUROLOGY | Age: 63
End: 2023-10-20
Payer: MEDICARE

## 2023-10-20 VITALS
WEIGHT: 293 LBS | DIASTOLIC BLOOD PRESSURE: 79 MMHG | SYSTOLIC BLOOD PRESSURE: 143 MMHG | HEART RATE: 82 BPM | HEIGHT: 67 IN | OXYGEN SATURATION: 96 % | BODY MASS INDEX: 45.99 KG/M2

## 2023-10-20 DIAGNOSIS — R56.9 SEIZURES (HCC): Primary | ICD-10-CM

## 2023-10-20 DIAGNOSIS — G25.2 ACTION TREMOR: ICD-10-CM

## 2023-10-20 PROCEDURE — 99214 OFFICE O/P EST MOD 30 MIN: CPT | Performed by: NURSE PRACTITIONER

## 2023-10-20 RX ORDER — LAMOTRIGINE 200 MG/1
TABLET ORAL
Qty: 180 TABLET | Refills: 1 | Status: SHIPPED | OUTPATIENT
Start: 2023-10-20

## 2023-10-20 RX ORDER — AMLODIPINE BESYLATE 5 MG/1
5 TABLET ORAL DAILY
COMMUNITY

## 2023-10-20 NOTE — PATIENT INSTRUCTIONS
Continue with current dosage of Lamictal. Refills given. Lamictal level, CBC and hepatic function panel10/2023    Take B12 supplements. No driving, swimming, operating heavy machinery or compromising heights until cleared. Report any new events. Call if any questions. Follow up in 6 months.    Call if any questions or concerns

## 2023-10-20 NOTE — PROGRESS NOTES
NEUROLOGY OUT PATIENT FOLLOW UP NOTE:  10/20/565095:08 PM    Georgie Rodriguez is here for follow up for seizure. Allergies   Allergen Reactions    Vimpat [Lacosamide] Other (See Comments)     dizziness    Zocor [Simvastatin] Other (See Comments)     Muscle achiness      Atorvastatin     Gabapentin Other (See Comments)     DOESN'T LIKE THE WAY IT MAKES HER FEEL. Current Outpatient Medications:     amLODIPine (NORVASC) 5 MG tablet, Take 1 tablet by mouth daily, Disp: , Rfl:     lamoTRIgine (LAMICTAL) 200 MG tablet, Take 1 tablet by mouth twice daily, Disp: 180 tablet, Rfl: 1    Cyanocobalamin (VITAMIN B-12) 3000 MCG SUBL, as directed Sublingual, Disp: , Rfl:     metFORMIN (GLUCOPHAGE-XR) 500 MG extended release tablet, TAKE 1 TABLET BY MOUTH ONCE DAILY WITH EVENING MEAL, Disp: , Rfl:     diphenhydrAMINE (BENADRYL) 25 MG tablet, Take 1 tablet by mouth every 6 hours as needed for Itching, Disp: , Rfl:     magnesium oxide (MAG-OX) 400 (241.3 Mg) MG TABS tablet, Take 1 tablet by mouth once daily, Disp: 30 tablet, Rfl: 5    levonorgestrel (MIRENA, 52 MG,) IUD 52 mg, Mirena 20 mcg/24 hours (5 yrs) 52 mg intrauterine device  Take 1 device by intrauterine route., Disp: , Rfl:     aspirin 81 MG tablet, Take 1 tablet by mouth daily, Disp: , Rfl:     carvedilol (COREG) 12.5 MG tablet, TAKE 1 TABLET BY MOUTH TWICE DAILY (Patient taking differently: 2 tablets TAKE 1 TABLET BY MOUTH TWICE DAILY), Disp: 180 tablet, Rfl: 0    I reviewed the past medical history, allergies, medications, social history and family history. PE:   Vitals:    10/20/23 1157   BP: (!) 143/79   Site: Right Upper Arm   Position: Sitting   Cuff Size: Large Adult   Pulse: 82   SpO2: 96%   Weight: (!) 148.8 kg (328 lb)   Height: 1.702 m (5' 7\")     General Appearance:  awake, alert, oriented   Gen: NAD, Language is Intact. Skin: no rash, lesion, dry  to touch. warm  Head: no rash, no icterus  Neck: There is no carotid bruits.  The Neck is

## 2023-10-30 ENCOUNTER — TELEPHONE (OUTPATIENT)
Dept: NEUROLOGY | Age: 63
End: 2023-10-30

## 2023-10-30 NOTE — TELEPHONE ENCOUNTER
Noted. Advise patient to wear support stocking, Avoid standing for prolonged periods of time. Take plenty of fluids with electrolytes.    Memo Gallo, CNP

## 2023-10-30 NOTE — TELEPHONE ENCOUNTER
Spoke with patient who stated she is struggling with her blood pressure being low. She is working with her provider to manage her BP meds. She can feel fuzzy headed at times, but she has been dealing with this on and off for a long time. She has issues with her balance for a while that she feels are getting worse over the past couple months. Her niece walks beside her when she leaves the house. She denies any new symptoms. Please advise. Thank you.

## 2023-10-30 NOTE — TELEPHONE ENCOUNTER
----- Message from Amparo Bumpers, APRN - CNP sent at 10/30/2023  8:47 AM EDT -----  Please let patient know her Lamictal level is mildly elevated=17.4. Is she having any side effects?   Kory Dallas CNP

## 2023-11-16 ENCOUNTER — TELEPHONE (OUTPATIENT)
Dept: NEUROLOGY | Age: 63
End: 2023-11-16

## 2023-11-16 DIAGNOSIS — R56.9 SEIZURES (HCC): Primary | ICD-10-CM

## 2023-11-16 NOTE — TELEPHONE ENCOUNTER
Difficult to tell if events were related to cardiac cause as she has been stable with seizure for a long time.    We will obtain Lamictal level  Ardath Crumbly, CNP

## 2023-11-16 NOTE — TELEPHONE ENCOUNTER
Patient notified and verbalized understanding.  Lab order mailed to address on profile per patient request.

## 2023-11-16 NOTE — TELEPHONE ENCOUNTER
Patient sent [employer] message stating on 11/8/23 she had episode of not knowing what was going on. Spoke with patient who stated patient was in the car with her sister and patient was talking and not making sense. Later that evening patient had another episode of confusion. Patient nephew witnessed patient not knowing how to sit in her irene. Patient denies loosing consciousness. Patient is taking Lamictal 200 mg twice a day. Patient denies missing any doses. Patient went to ED on Friday 11/10/23 and was admitted for chest pain. Patient had cardiac cath on 11/14/23 and was stented due to 2 heart blockages. Please advise. Thank you.

## 2023-12-07 ENCOUNTER — TELEPHONE (OUTPATIENT)
Dept: NEUROLOGY | Age: 63
End: 2023-12-07

## 2023-12-07 DIAGNOSIS — R56.9 SEIZURES (HCC): ICD-10-CM

## 2023-12-07 DIAGNOSIS — R42 DIZZINESS: Primary | ICD-10-CM

## 2023-12-07 NOTE — TELEPHONE ENCOUNTER
Since level of Lamictal is within acceptable range recently. Recommend. Obtain MRI Brain, without contrast. EEG. I will place orders.  Thanks     Marvin Stafford MD

## 2023-12-07 NOTE — TELEPHONE ENCOUNTER
Patient notified and verbalized understanding. Patient stated she is claustrophobic and is asking for something to help her remain calm for the MRI on 1/5/24. Patient uses [facility]. Please advise. Thank you.

## 2023-12-07 NOTE — TELEPHONE ENCOUNTER
Patient sent [employer] message stating for the past 6 weeks she is getting fuzzy headed and really tired about 30 minutes after taking her medication that lasts for about 1.5 hours after she takes her medication. Patient had heart stents placed 11/14/23 and feels the fuzzy headed and tiredness is getting worse over the past 6 weeks. She is taking Lamictal 200 mg twice a day. Last Lamictal level 11/28/23= 15.6. She is taking the Lamictal with food. Please advise. Thank you.

## 2023-12-08 DIAGNOSIS — R56.9 SEIZURES (HCC): ICD-10-CM

## 2023-12-08 DIAGNOSIS — R20.2 NUMBNESS AND TINGLING OF LEFT SIDE OF FACE: Primary | ICD-10-CM

## 2023-12-08 DIAGNOSIS — R20.0 NUMBNESS AND TINGLING OF LEFT SIDE OF FACE: Primary | ICD-10-CM

## 2023-12-08 RX ORDER — LORAZEPAM 0.5 MG/1
0.5 TABLET ORAL
Qty: 1 TABLET | Refills: 0 | Status: SHIPPED | OUTPATIENT
Start: 2023-12-08 | End: 2023-12-08

## 2023-12-08 NOTE — TELEPHONE ENCOUNTER
I sent script of Ativan 0.5 mg one tablet to pharmacy requested. I do not feel comfortable given higher dosage. Please ask patient not to drive that day, to take someone with her to the MRI.    Kalpesh Lanza MD

## 2024-01-05 ENCOUNTER — TELEPHONE (OUTPATIENT)
Dept: NEUROLOGY | Age: 64
End: 2024-01-05

## 2024-01-05 ENCOUNTER — APPOINTMENT (OUTPATIENT)
Dept: MRI IMAGING | Age: 64
End: 2024-01-05
Attending: PSYCHIATRY & NEUROLOGY
Payer: MEDICARE

## 2024-01-05 ENCOUNTER — HOSPITAL ENCOUNTER (OUTPATIENT)
Dept: NEUROLOGY | Age: 64
Discharge: HOME | End: 2024-01-05
Payer: MEDICARE

## 2024-01-05 DIAGNOSIS — R56.9 SEIZURES (HCC): ICD-10-CM

## 2024-01-05 DIAGNOSIS — R42 DIZZINESS: ICD-10-CM

## 2024-01-05 PROCEDURE — 95816 EEG AWAKE AND DROWSY: CPT

## 2024-01-05 NOTE — PROGRESS NOTES
[facility]     Neurodiagnostic Laboratory Technician Worksheet      EEG Date: 2024    Name: Rashmi Okeefe   : 1960   Age: 63 y.o.  SEX: female    ROOM: OP MRN: [id number]           CSN: [id number]      Ordering Provider: Geoff  EEG Number: [id number] Time of Test:  1329    Hand: Right   Sedation:  ativan at 0900 for MRI    H.V. Done: No  age protocol  Photic: Yes    Sleep: No  Drowsy: Yes   Sleep Deprived: No    Seizures observed: no    Mentality: alert      Clinical History:  Dizzy,  hx of seizure,  in car driving talking to sister but dont remember conversation.    Mri  24  FINDINGS:     There is mild cerebral atrophy with widening of the extra-axial spaces and     ventricular dilatation.  There are multiple white matter hyperintensities,     distributed throughout the deep white matter tracts of the cerebral     hemispheres, consistent with moderate chronic white matter ischemic     changes.  There is no evidence for recent intracranial ischemia or other     cause of cytotoxic edema on diffusion weighted imaging (DWI).  Normal T2*     images of the brain without demonstrated susceptibility artifact. There is     no demonstrated hemosiderin stain.          Normal bilateral basal ganglia.  Normal thalami.  There is no extra-axial     fluid accumulation.          Normal flow voids within the major intracranial circulation suggesting     patency by spin echo criteria.          Normal sella turcica, pituitary gland, infundibular stalk, optic chiasm     and hypothalamus.  Normal tectal plate and pineal gland.          Normal midbrain, ora and medulla.  Normal cerebellum.  Normal basal     cisterns.  Normal bilateral temporal bones.  Normal bilateral internal     auditory canals.          No demonstrated orbital abnormality, within the constraints of a routine     brain study.  Normal visualized paranasal sinuses.  Normal calvarium and     skull base.  Normal visualized soft tissue

## 2024-01-05 NOTE — TELEPHONE ENCOUNTER
----- Message from Tiffanie Tellez MD sent at 1/5/2024 12:13 PM EST -----  Please let patient Know MRI brain shows no concerning findings. Will discuss further on follow up. Tiffanie Tellez MD

## 2024-01-10 ENCOUNTER — TELEPHONE (OUTPATIENT)
Dept: NEUROLOGY | Age: 64
End: 2024-01-10

## 2024-01-10 DIAGNOSIS — R42 DIZZINESS AND GIDDINESS: ICD-10-CM

## 2024-01-10 DIAGNOSIS — I49.9 IRREGULAR HEART RHYTHM: Primary | ICD-10-CM

## 2024-01-10 NOTE — TELEPHONE ENCOUNTER
----- Message from Tiffanie Tellez MD sent at 1/10/2024  9:06 AM EST -----  Please let patient know EEG shows no seizure activity.   However the EKG tracing is irregular, she needs to have a 14 day event monitor. Please place order for it and we can ask her cardiologist of choice read it. (Need to screen for Afib.)  Tiffanie Tellez MD 9:04 AM

## 2024-01-10 NOTE — TELEPHONE ENCOUNTER
Patient was notified. Verbalized understanding, she stated she was diagnosed with rapid AFIB the week she had her EEG test done and is following up with a local cardiologist.

## 2024-01-10 NOTE — TELEPHONE ENCOUNTER
Noted, then there is no need to do the event monitor through us, as this is being managed by her local cardiologist, and it is a know condition being addressed. Please cancel the event monitor I ordered earlier today. Thanks.   Tiffanie Tellez MD

## 2024-01-10 NOTE — PROGRESS NOTES
Event monitor ordered, following EKG tracing abnormality on EEG. Screening for Afib.   Tiffanie Tellez MD

## 2024-01-10 NOTE — TELEPHONE ENCOUNTER
----- Message from Tiffanie Tellez MD sent at 1/10/2024  9:06 AM EST -----  Please let patient know EEG shows no seizure activity.   However the EKG tracing is irregular, she needs to have a 14 day event monitor. Please place order for it and we can ask her cardiologist of choice read it. (Need to screen for Afib.)  Tiffanei Tellez MD 9:04 AM

## 2024-05-20 ENCOUNTER — OFFICE VISIT (OUTPATIENT)
Dept: NEUROLOGY | Age: 64
End: 2024-05-20
Payer: MEDICARE

## 2024-05-20 VITALS
DIASTOLIC BLOOD PRESSURE: 74 MMHG | OXYGEN SATURATION: 98 % | HEIGHT: 67 IN | SYSTOLIC BLOOD PRESSURE: 146 MMHG | HEART RATE: 67 BPM | BODY MASS INDEX: 45.99 KG/M2 | WEIGHT: 293 LBS

## 2024-05-20 DIAGNOSIS — G25.2 ACTION TREMOR: ICD-10-CM

## 2024-05-20 DIAGNOSIS — R56.9 SEIZURES (HCC): Primary | ICD-10-CM

## 2024-05-20 PROCEDURE — 99214 OFFICE O/P EST MOD 30 MIN: CPT | Performed by: PSYCHIATRY & NEUROLOGY

## 2024-05-20 RX ORDER — DILTIAZEM HYDROCHLORIDE 120 MG/1
120 CAPSULE, COATED, EXTENDED RELEASE ORAL DAILY
COMMUNITY
Start: 2024-04-30

## 2024-05-20 RX ORDER — ATORVASTATIN CALCIUM 40 MG/1
40 TABLET, FILM COATED ORAL NIGHTLY
COMMUNITY
Start: 2023-11-15

## 2024-05-20 RX ORDER — ISOSORBIDE MONONITRATE 30 MG/1
30 TABLET, EXTENDED RELEASE ORAL DAILY
COMMUNITY
Start: 2023-11-15

## 2024-05-20 RX ORDER — LISINOPRIL 10 MG/1
5 TABLET ORAL DAILY
COMMUNITY
Start: 2023-08-18

## 2024-05-20 NOTE — PATIENT INSTRUCTIONS
Continue with current dosage of Lamictal. Refills given.   Lamictal level, CBC and hepatic 6/2024    Take B12 supplements.   No driving, swimming, operating heavy machinery or compromising heights. Report any new events. Call if any questions.  Follow up in 6 months.   Call if any questions or concerns

## 2024-05-20 NOTE — PROGRESS NOTES
awake, alert, oriented, in no distress  Gen: NAD, Language is Intact. Skin: no rash, lesion, dry  to touch. warm  Head: no rash, no icterus  Neck:  The Neck is supple.   Neuro: CN 2-12 grossly intact with no focal deficits. Power 5/5 Throughout symmetric, Reflexes are  symmetric. Long tracts are intact. Cerebellar exam is Intact. Sensory exam is intact to light touch.  Gait is limping left knee.  Musculoskeletal:  Has no hand arthritis, no limitation of ROM in any of the four extremities.  Lower extremities +2 edema          DATA:         Results for orders placed or performed in visit on 21   Creatinine   Result Value Ref Range    Creatinine 1.3 0.6 - 1.3 mg/dL    Creatinine Clearance 42                Results for orders placed in visit on 21    MRI BRAIN W WO CONTRAST    Narrative  Ordering Provider: Tiffanie Tellez  [facility]    Radiology Department  Patient:  AILEEN GRUBBS  [address].  :  1960   [place] [address]  Location:  [place] [address]   Unit #:   [id number]  Acct #:  [id number]  Ordering Phys: Tiffanie Tellez MD    Exam Date: 21  Accession #:  [id number]  Exam:  MRI   MRI Brain w/wo Contrast  Result: See Report    STUDY:  MRI BRAIN WITH AND WITHOUT CONTRAST    REASON FOR EXAM:   Female, 61 years old.  BALANCE ISSUES, LEFT SIDE OF  BODY GETS COLD AND GOES NUMB, SPEECH DIFFICULTIES, BEEN ONGOING FOR  SEVERAL MONTHS, NO INJURY OR SURGERY --  -- CREAT 1.3 -- GFR 42    TECHNIQUE:   Standardized multiplanar fat and water weighted pulse  sequences were obtained.  IV Gadavist 15 ML was administered for the  contrast portion of the examination.    COMPARISON:   2019  ___________________________________    FINDINGS:  There is mild cerebral atrophy with widening of the extra-axial spaces and  ventricular dilatation.  There are a limited number of small white matter  hyperintensities, distributed throughout the deep white matter tracts of  the

## 2024-06-19 DIAGNOSIS — R56.9 SEIZURES (HCC): ICD-10-CM

## 2024-06-19 RX ORDER — LAMOTRIGINE 200 MG/1
TABLET ORAL
Qty: 180 TABLET | Refills: 1 | Status: SHIPPED | OUTPATIENT
Start: 2024-06-19

## 2024-06-19 NOTE — TELEPHONE ENCOUNTER
Rashmi A Johnson called requesting a refill on the following medications:  Requested Prescriptions     Pending Prescriptions Disp Refills    lamoTRIgine (LAMICTAL) 200 MG tablet [Pharmacy Med Name: LAMOTRIGINE 200MG TABS] 180 tablet 0     Sig: TAKE 1 TABLET TWICE A DAY       Date of last visit: 5/20/2024- Dr. Tellez  Date of next visit 11/18/24- Dr. Tellez  Date of last refill: 10/20/23  Pharmacy Name: [facility],  Mary Samayoa LPN

## 2024-08-12 DIAGNOSIS — R56.9 SEIZURES (HCC): ICD-10-CM

## 2024-08-13 RX ORDER — LAMOTRIGINE 200 MG/1
TABLET ORAL
Qty: 180 TABLET | Refills: 1 | OUTPATIENT
Start: 2024-08-13

## 2024-08-18 DIAGNOSIS — R56.9 SEIZURES (HCC): ICD-10-CM

## 2024-08-19 RX ORDER — LAMOTRIGINE 200 MG/1
TABLET ORAL
Qty: 180 TABLET | Refills: 1 | OUTPATIENT
Start: 2024-08-19

## 2024-08-19 NOTE — RESULT ENCOUNTER NOTE
Please let patient Know Lamictal level was therapeutic range. Other blood work results showed no concerning findings.  Tiffanie Tellez MD

## 2024-08-20 ENCOUNTER — TELEPHONE (OUTPATIENT)
Dept: NEUROLOGY | Age: 64
End: 2024-08-20

## 2024-08-20 NOTE — TELEPHONE ENCOUNTER
----- Message from Dr. Tiffanie Tellez MD sent at 8/19/2024  2:17 PM EDT -----  Please let patient Know Lamictal level was therapeutic range. Other blood work results showed no concerning findings.  Tiffanie Tellez MD

## 2024-08-29 ENCOUNTER — PATIENT MESSAGE (OUTPATIENT)
Dept: NEUROLOGY | Age: 64
End: 2024-08-29

## 2024-08-29 DIAGNOSIS — R56.9 SEIZURES (HCC): ICD-10-CM

## 2024-08-30 RX ORDER — LAMOTRIGINE 200 MG/1
TABLET ORAL
Qty: 20 TABLET | Refills: 0 | Status: SHIPPED | OUTPATIENT
Start: 2024-08-30

## 2024-08-30 NOTE — TELEPHONE ENCOUNTER
Patient requesting prescription see [employer] message.   Patient requesting 10 day supply.   Please advise.

## 2024-08-30 NOTE — TELEPHONE ENCOUNTER
Rashmi Okeefe  [facility] (supporting Tiffanie Tellez MD)15 hours ago (4:57 PM)     I have asked for a 5-10 day prescription of lamictal to be Sent to [facility] as my regular pharmacy has a delay in delivery and I only have one day left I have asked for this a few weeks ago and they keep sending it to my mail order pharmacy! Getting very frustrated!

## 2024-09-05 DIAGNOSIS — R56.9 SEIZURES (HCC): ICD-10-CM

## 2024-09-05 RX ORDER — LAMOTRIGINE 200 MG/1
TABLET ORAL
Qty: 20 TABLET | Refills: 0 | OUTPATIENT
Start: 2024-09-05

## 2024-10-07 ENCOUNTER — OFFICE VISIT (OUTPATIENT)
Dept: NEUROLOGY | Age: 64
End: 2024-10-07
Payer: MEDICARE

## 2024-10-07 ENCOUNTER — HOSPITAL ENCOUNTER (EMERGENCY)
Age: 64
Discharge: HOME | End: 2024-10-07
Attending: EMERGENCY MEDICINE
Payer: MEDICARE

## 2024-10-07 ENCOUNTER — PATIENT MESSAGE (OUTPATIENT)
Dept: NEUROLOGY | Age: 64
End: 2024-10-07

## 2024-10-07 ENCOUNTER — APPOINTMENT (OUTPATIENT)
Dept: GENERAL RADIOLOGY | Age: 64
End: 2024-10-07
Payer: MEDICARE

## 2024-10-07 ENCOUNTER — APPOINTMENT (OUTPATIENT)
Dept: CT IMAGING | Age: 64
End: 2024-10-07
Payer: MEDICARE

## 2024-10-07 VITALS
OXYGEN SATURATION: 97 % | RESPIRATION RATE: 16 BRPM | SYSTOLIC BLOOD PRESSURE: 136 MMHG | DIASTOLIC BLOOD PRESSURE: 76 MMHG | TEMPERATURE: 98.3 F | HEART RATE: 68 BPM

## 2024-10-07 VITALS
SYSTOLIC BLOOD PRESSURE: 146 MMHG | WEIGHT: 293 LBS | HEART RATE: 81 BPM | TEMPERATURE: 96.6 F | OXYGEN SATURATION: 100 % | DIASTOLIC BLOOD PRESSURE: 68 MMHG | HEIGHT: 67 IN | BODY MASS INDEX: 45.99 KG/M2

## 2024-10-07 DIAGNOSIS — S00.83XA FACIAL HEMATOMA, INITIAL ENCOUNTER: Primary | ICD-10-CM

## 2024-10-07 DIAGNOSIS — R56.9 SEIZURES (HCC): Primary | ICD-10-CM

## 2024-10-07 DIAGNOSIS — S00.83XD CONTUSION OF FACE, SUBSEQUENT ENCOUNTER: ICD-10-CM
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
## 2024-10-07 DIAGNOSIS — G25.2 ACTION TREMOR: ICD-10-CM

## 2024-10-07 LAB
ANION GAP SERPL CALC-SCNC: 14 MEQ/L (ref 8–16)
APTT PPP: 46.6 SECONDS (ref 22–38)
BASOPHILS ABSOLUTE: 0.1 THOU/MM3 (ref 0–0.1)
BASOPHILS NFR BLD AUTO: 0.5 %
BUN SERPL-MCNC: 12 MG/DL (ref 7–22)
CALCIUM SERPL-MCNC: 9.5 MG/DL (ref 8.5–10.5)
CHLORIDE SERPL-SCNC: 104 MEQ/L (ref 98–111)
CO2 SERPL-SCNC: 25 MEQ/L (ref 23–33)
CREAT SERPL-MCNC: 1 MG/DL (ref 0.4–1.2)
DEPRECATED RDW RBC AUTO: 47.8 FL (ref 35–45)
EOSINOPHIL NFR BLD AUTO: 2.6 %
EOSINOPHILS ABSOLUTE: 0.3 THOU/MM3 (ref 0–0.4)
ERYTHROCYTE [DISTWIDTH] IN BLOOD BY AUTOMATED COUNT: 15.4 % (ref 11.5–14.5)
GFR SERPL CREATININE-BSD FRML MDRD: 63 ML/MIN/1.73M2
GLUCOSE SERPL-MCNC: 109 MG/DL (ref 70–108)
HCT VFR BLD AUTO: 35.2 % (ref 37–47)
HGB BLD-MCNC: 10.9 GM/DL (ref 12–16)
IMM GRANULOCYTES # BLD AUTO: 0.06 THOU/MM3 (ref 0–0.07)
IMM GRANULOCYTES NFR BLD AUTO: 0.5 %
INR PPP: 2.02 (ref 0.85–1.13)
LYMPHOCYTES ABSOLUTE: 2.1 THOU/MM3 (ref 1–4.8)
LYMPHOCYTES NFR BLD AUTO: 19 %
MCH RBC QN AUTO: 26.5 PG (ref 26–33)
MCHC RBC AUTO-ENTMCNC: 31 GM/DL (ref 32.2–35.5)
MCV RBC AUTO: 85.6 FL (ref 81–99)
MONOCYTES ABSOLUTE: 1 THOU/MM3 (ref 0.4–1.3)
MONOCYTES NFR BLD AUTO: 8.8 %
NEUTROPHILS ABSOLUTE: 7.5 THOU/MM3 (ref 1.8–7.7)
NEUTROPHILS NFR BLD AUTO: 68.6 %
NRBC BLD AUTO-RTO: 0 /100 WBC
OSMOLALITY SERPL CALC.SUM OF ELEC: 285.3 MOSMOL/KG (ref 275–300)
PLATELET # BLD AUTO: 376 THOU/MM3 (ref 130–400)
PMV BLD AUTO: 10 FL (ref 9.4–12.4)
POTASSIUM SERPL-SCNC: 4.4 MEQ/L (ref 3.5–5.2)
RBC # BLD AUTO: 4.11 MILL/MM3 (ref 4.2–5.4)
SODIUM SERPL-SCNC: 143 MEQ/L (ref 135–145)
WBC # BLD AUTO: 10.9 THOU/MM3 (ref 4.8–10.8)

## 2024-10-07 PROCEDURE — 99214 OFFICE O/P EST MOD 30 MIN: CPT | Performed by: PSYCHIATRY & NEUROLOGY

## 2024-10-07 PROCEDURE — 99285 EMERGENCY DEPT VISIT HI MDM: CPT

## 2024-10-07 PROCEDURE — 85610 PROTHROMBIN TIME: CPT

## 2024-10-07 PROCEDURE — 85730 THROMBOPLASTIN TIME PARTIAL: CPT

## 2024-10-07 PROCEDURE — 80048 BASIC METABOLIC PNL TOTAL CA: CPT

## 2024-10-07 PROCEDURE — 70450 CT HEAD/BRAIN W/O DYE: CPT

## 2024-10-07 PROCEDURE — 36415 COLL VENOUS BLD VENIPUNCTURE: CPT

## 2024-10-07 PROCEDURE — 74177 CT ABD & PELVIS W/CONTRAST: CPT

## 2024-10-07 PROCEDURE — 71260 CT THORAX DX C+: CPT

## 2024-10-07 PROCEDURE — 73030 X-RAY EXAM OF SHOULDER: CPT

## 2024-10-07 PROCEDURE — 72125 CT NECK SPINE W/O DYE: CPT

## 2024-10-07 PROCEDURE — 76376 3D RENDER W/INTRP POSTPROCES: CPT

## 2024-10-07 PROCEDURE — 85025 COMPLETE CBC W/AUTO DIFF WBC: CPT

## 2024-10-07 PROCEDURE — 6360000004 HC RX CONTRAST MEDICATION: Performed by: EMERGENCY MEDICINE

## 2024-10-07 PROCEDURE — 70486 CT MAXILLOFACIAL W/O DYE: CPT

## 2024-10-07 RX ORDER — LISINOPRIL 5 MG/1
5 TABLET ORAL DAILY
COMMUNITY
Start: 2024-07-19

## 2024-10-07 RX ORDER — IOPAMIDOL 755 MG/ML
80 INJECTION, SOLUTION INTRAVASCULAR
Status: COMPLETED | OUTPATIENT
Start: 2024-10-07 | End: 2024-10-07

## 2024-10-07 RX ORDER — RIVAROXABAN 20 MG/1
TABLET, FILM COATED ORAL
COMMUNITY
Start: 2024-08-12

## 2024-10-07 RX ADMIN — IOPAMIDOL 80 ML: 755 INJECTION, SOLUTION INTRAVENOUS at 15:33

## 2024-10-07 ASSESSMENT — PAIN - FUNCTIONAL ASSESSMENT
PAIN_FUNCTIONAL_ASSESSMENT: NONE - DENIES PAIN

## 2024-10-07 NOTE — DISCHARGE INSTRUCTIONS
You were seen here today after a fall.  Take Tylenol as needed for pain.  Follow-up with your primary care doctor as needed.  Return here if develop chest pain, abdominal pain, confusion, difficulty moving arms or legs.  Return here if develop a severe headache.

## 2024-10-07 NOTE — PATIENT INSTRUCTIONS
Go to the emergency room now re recent fall with head injury on blood thinners.   Continue with current dosage of Lamictal. Refills given.   Lamictal level, CBC and hepatic panel    No driving, swimming, operating heavy machinery or compromising heights.   Follow up in 6 months.   Call if any questions or concerns

## 2024-10-07 NOTE — PROGRESS NOTES
MD,Fredo LESTER on 05/19/21 3085           Assessment:     Diagnosis Orders   1. Seizures (HCC)        2. Action tremor        3. Fall, initial encounter        4. Contusion of face, subsequent encounter             Follow up for seizure. she denies seizure. However she reports she had a fall Thursday 4 days ago, around noon, tripped over a shopping bag in her porch, that she could not see, landed on her right shoulder and right forehead. With sore knees, she did not go to ER. She had a bleeding in the right forehead, orbit. On exam. alert, oriented, anxious, denies pain. There is bruising noted on the right supraorbital area, there is variable age bruising on the right orbit, lateral and medial orbit, right cheek, into the right jaw.  The EOM are intact, there is no APD, there is right lateral sclera triangular bleeding. There is tenderness over the right orbital ridge. She is on Brilinta and aspirin. For CAD, s/p two stents, one in the mid LAD,and diagonal vessel. She is on Brilinta and asprin,. Then is also on Xarelto for Afib. Most recent MRI brain 1/5/2024, showed involutional changes. and EEG was normal.no seizure. She does not drive.. she is  at high risk of ICH with the recent significant fall on blood thinners, she is also noted to sclera injury right eye. She needs urgent evaluation, she does not have a good explanation why she did not seek help, I shared with her she needs to go to ER immediately. Her seizures are controlled.     Plan:  Go to the emergency room now re recent fall with head injury on blood thinners.   Continue with current dosage of Lamictal. Refills given.   Lamictal level, CBC and hepatic 6/2024    No driving, swimming, operating heavy machinery or compromising heights.   Follow up in 6 months.   Call if any questions or concerns       Total time 32 min    Tiffanie Tellez MD

## 2024-10-07 NOTE — ED TRIAGE NOTES
Pt reports to ED from home. Pt states she fell 10/3 because she tripped over a chair. Hit her head, and shoulder on right side. Pt taking blood thinners for hx of Afib. Pt denies LOC. Pt states she was at her check up today and they suggest she come in. Noted hematoma on right side of face and neck with swelling above right eye. Pt states it doesn't hurt unless you touch it. EKG complete. Vitals assessed. RR easy and unlabored.

## 2024-10-07 NOTE — ED PROVIDER NOTES
[facility] EMERGENCY DEPT      EMERGENCY MEDICINE     Pt Name: Rashmi Okeefe  MRN: [id number]  Birthdate 1960  Date of evaluation: 10/7/2024  Provider: Reynaldo Viveros DO  Supervising Physician: Rudi Chester    CHIEF COMPLAINT       Chief Complaint   Patient presents with    Fall     10/3     HISTORY OF PRESENT ILLNESS   Rashmi Okeefe is a 64 y.o. female with a h/o CAD and PAF on Xarelto and Brillinta, seizures who presents to the emergency department from neurology office for evaluation of a recent fall.  Patient fell 4 days ago while trying to step into her house and missing a step and face planting.  Patient denies LOC.  Patient able to ambulate afterwards.  Patient initially had just a goose egg around her eye then reports that the bruising has significantly spread.  Patient also reports bruising to her right shoulder and her posterior torso.  Patient currently symptom-free, denies any vision changes, weakness, seizures, chest pain, abdominal pain.    PASTMEDICAL HISTORY     Past Medical History:   Diagnosis Date    Difficult intubation     Headache     Kidney stones     Psoriasis     Seizures ([facility])        Patient Active Problem List   Diagnosis Code    Partial seizure ([facility]) R56.9    Intercostal pain R07.82    Angina, class II ([facility]) I20.9    Coronary artery disease involving native coronary artery RCAdistal 50% mild the rest  I25.10    Post-menopausal bleeding N95.0    S/P D&C (status post dilation and curettage) Z98.890    Seizures ([facility]) R56.9    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

## 2024-11-05 DIAGNOSIS — R56.9 SEIZURES (HCC): ICD-10-CM

## 2024-11-05 RX ORDER — LAMOTRIGINE 200 MG/1
TABLET ORAL
Qty: 180 TABLET | Refills: 1 | Status: SHIPPED | OUTPATIENT
Start: 2024-11-05

## 2024-11-05 NOTE — TELEPHONE ENCOUNTER
Rashmi A Johnson called requesting a refill on the following medications:  Requested Prescriptions     Pending Prescriptions Disp Refills    lamoTRIgine (LAMICTAL) 200 MG tablet [Pharmacy Med Name: LAMOTRIGINE 200MG TABS] 180 tablet 1     Sig: TAKE ONE TABLET BY MOUTH TWICE A DAY       Date of last visit: 10/7/2024- Dr. Tellez  Date of next visit (if applicable):4/11/2025- Dr. Tellez  Date of last refill: 8/30/24  Pharmacy Name: [facility],  Mary Samayoa LPN

## 2025-04-07 ENCOUNTER — OFFICE VISIT (OUTPATIENT)
Dept: NEUROLOGY | Age: 65
End: 2025-04-07
Payer: MEDICARE

## 2025-04-07 VITALS
HEART RATE: 77 BPM | BODY MASS INDEX: 45.99 KG/M2 | SYSTOLIC BLOOD PRESSURE: 126 MMHG | DIASTOLIC BLOOD PRESSURE: 70 MMHG | WEIGHT: 293 LBS | HEIGHT: 67 IN

## 2025-04-07 DIAGNOSIS — G25.2 ACTION TREMOR: ICD-10-CM

## 2025-04-07 DIAGNOSIS — R56.9 SEIZURES (HCC): Primary | ICD-10-CM

## 2025-04-07 PROCEDURE — 99214 OFFICE O/P EST MOD 30 MIN: CPT | Performed by: PSYCHIATRY & NEUROLOGY

## 2025-04-07 RX ORDER — LAMOTRIGINE 200 MG/1
TABLET ORAL
Qty: 180 TABLET | Refills: 3 | Status: SHIPPED | OUTPATIENT
Start: 2025-04-07

## 2025-04-07 NOTE — PATIENT INSTRUCTIONS
Continue with current dosage of Lamictal 200 mg twice a day. Refills given.   Lamictal level, CBC and hepatic panel ordered.   No driving, swimming, operating heavy machinery or compromising heights.   Follow up in 6 months.   Call if any questions or concerns

## 2025-04-25 LAB
A/G RATIO: 1.3 RATIO (ref 0.8–2.6)
ALBUMIN: 4 G/DL (ref 3.5–5.2)
ALP BLD-CCNC: 128 U/L (ref 23–144)
ALT SERPL-CCNC: 9 U/L (ref 0–60)
AST SERPL-CCNC: 15 U/L (ref 0–55)
BASOPHILS ABSOLUTE: 0.1 K/UL (ref 0–0.3)
BASOPHILS RELATIVE PERCENT: 1 % (ref 0–2)
BILIRUB SERPL-MCNC: 0.3 MG/DL (ref 0–1.2)
BILIRUBIN DIRECT: 0.1 MG/DL (ref 0–0.4)
BILIRUBIN, INDIRECT: 0.2 MG/DL (ref 0–1.2)
DIFFERENTIAL COUNT: ABNORMAL
EOSINOPHILS ABSOLUTE: 0.3 K/UL (ref 0–0.5)
EOSINOPHILS RELATIVE PERCENT: 3.6 % (ref 0–5)
GLOBULIN: 3 G/DL (ref 1.9–3.6)
HCT VFR BLD CALC: 34.4 % (ref 34–49)
HEMOGLOBIN: 10.8 G/DL (ref 11.2–15.7)
IMMATURE GRANS (ABS): 0 K/UL (ref 0–0.1)
IMMATURE GRANULOCYTES %: 0.3 %
LYMPHOCYTES ABSOLUTE: 2.3 K/UL (ref 0.9–4.1)
LYMPHOCYTES RELATIVE PERCENT: 32.4 % (ref 14–51)
MCH RBC QN AUTO: 25.5 PG (ref 26–34)
MCHC RBC AUTO-ENTMCNC: 31.4 G/DL (ref 30.7–35.5)
MCV RBC AUTO: 81.3 FL (ref 80–100)
MONOCYTES ABSOLUTE: 0.8 K/UL (ref 0.2–1)
MONOCYTES RELATIVE PERCENT: 10.9 % (ref 4–12)
NEUTROPHILS ABSOLUTE: 3.7 K/UL (ref 1.8–7.5)
NEUTROPHILS RELATIVE PERCENT: 51.8 % (ref 42–80)
PDW BLD-RTO: 15.1 %
PLATELET # BLD: 323 K/UL (ref 140–400)
PMV BLD AUTO: 10.4 FL (ref 7.2–11.7)
RBC # BLD: 4.23 M/UL (ref 3.95–5.26)
RETICULOCYTE ABSOLUTE COUNT: 0 /100 WBC
TOTAL PROTEIN: 7 G/DL (ref 6–8.3)
WBC # BLD: 7.2 K/UL (ref 3.5–10.9)

## 2025-04-29 ENCOUNTER — RESULTS FOLLOW-UP (OUTPATIENT)
Dept: NEUROLOGY | Age: 65
End: 2025-04-29

## 2025-04-29 LAB — LAMOTRIGINE LEVEL: 13.4 MCG/ML (ref 2.5–15)

## (undated) DEVICE — TUBING, SUCTION, 1/4" X 20', STRAIGHT: Brand: MEDLINE INDUSTRIES, INC.

## (undated) DEVICE — PADDING,UNDERCAST,COTTON, 4"X4YD STERILE: Brand: MEDLINE

## (undated) DEVICE — PAD,ABDOMINAL,5"X9",ST,LF,25/BX: Brand: MEDLINE INDUSTRIES, INC.

## (undated) DEVICE — 3M™ WARMING BLANKET, UPPER BODY, 10 PER CASE, 42268: Brand: BAIR HUGGER™

## (undated) DEVICE — GLOVE ORANGE PI 8 1/2   MSG9085

## (undated) DEVICE — GLOVE SURG SZ 65 THK91MIL LTX FREE SYN POLYISOPRENE

## (undated) DEVICE — SOLUTION IV 1000ML LAC RINGERS PH 6.5 INJ USP VIAFLX PLAS

## (undated) DEVICE — DRAIN,WOUND,15FR,3/16,FULL-FLUTED: Brand: MEDLINE

## (undated) DEVICE — CATHETER,URETHRAL,REDRUBBER,STRL,14FR: Brand: MEDLINE INDUSTRIES, INC.

## (undated) DEVICE — GLOVE ORANGE PI 7   MSG9070

## (undated) DEVICE — YANKAUER,BULB TIP,W/O VENT,RIGID,STERILE: Brand: MEDLINE

## (undated) DEVICE — GLOVE ORANGE PI 8   MSG9080

## (undated) DEVICE — PAD,SANITARY,11 IN,MAXI,W/WINGS,N-STRL: Brand: MEDLINE

## (undated) DEVICE — 4-PORT MANIFOLD: Brand: NEPTUNE 2

## (undated) DEVICE — COVER ARMBRD W13XL28.5IN IMPERV BLU FOR OP RM

## (undated) DEVICE — Device

## (undated) DEVICE — CONTAINER VACUTAINER ANAER CULTURE SWAB

## (undated) DEVICE — LINER SUCT CANSTR 1500CC SEMI RIG W/ POR HYDROPHOBIC SHUT

## (undated) DEVICE — Y-TYPE TUR/BLADDER IRRIGATION SET, REGULATING CLAMP

## (undated) DEVICE — MARKER,SKIN,WI/RULER AND LABELS: Brand: MEDLINE

## (undated) DEVICE — SYSTEM COLL W/ TISS TRAP INCLUDE COLL CANSTR LID SET OF

## (undated) DEVICE — GLOVE ORANGE PI 7 1/2   MSG9075

## (undated) DEVICE — SOLUTION IV 1000ML 0.9% SOD CHL PH 5 INJ USP VIAFLX PLAS

## (undated) DEVICE — SOLUTION IV IRRIG POUR BRL 0.9% SODIUM CHL 2F7124

## (undated) DEVICE — GOWN,SIRUS,NONRNF,SETINSLV,XL,20/CS: Brand: MEDLINE

## (undated) DEVICE — PAD,NON-ADHERENT,3X8,STERILE,LF,1/PK: Brand: MEDLINE

## (undated) DEVICE — BLADE LARYNSCP SZ 3 ENH DIR INTUB GLIDESCOPE MCGRATH MAC

## (undated) DEVICE — KIT EVAC 400CC PVC RADPQ Y CONN

## (undated) DEVICE — STRIP,CLOSURE,WOUND,MEDI-STRIP,1/2X4: Brand: MEDLINE

## (undated) DEVICE — GAUZE,SPONGE,8"X4",12PLY,XRAY,STRL,LF: Brand: MEDLINE

## (undated) DEVICE — INTENDED FOR TISSUE SEPARATION, AND OTHER PROCEDURES THAT REQUIRE A SHARP SURGICAL BLADE TO PUNCTURE OR CUT.: Brand: BARD-PARKER ® CARBON RIB-BACK BLADES

## (undated) DEVICE — SPONGE GZ W4XL4IN COT 12 PLY TYP VII WVN C FLD DSGN

## (undated) DEVICE — GOWN,SIRUS,NON REINFRCD,LARGE,SET IN SL: Brand: MEDLINE